# Patient Record
Sex: FEMALE | Race: WHITE | NOT HISPANIC OR LATINO | Employment: FULL TIME | URBAN - METROPOLITAN AREA
[De-identification: names, ages, dates, MRNs, and addresses within clinical notes are randomized per-mention and may not be internally consistent; named-entity substitution may affect disease eponyms.]

---

## 2023-04-10 DIAGNOSIS — E11.9 TYPE 2 DIABETES MELLITUS WITHOUT COMPLICATION, UNSPECIFIED WHETHER LONG TERM INSULIN USE (MULTI): Primary | ICD-10-CM

## 2023-04-10 RX ORDER — GLIMEPIRIDE 4 MG/1
2 TABLET ORAL DAILY
COMMUNITY
Start: 2022-03-28 | End: 2023-04-10 | Stop reason: SDUPTHER

## 2023-04-13 RX ORDER — GLIMEPIRIDE 4 MG/1
4 TABLET ORAL 2 TIMES DAILY
Qty: 180 TABLET | Refills: 0 | Status: SHIPPED | OUTPATIENT
Start: 2023-04-13 | End: 2023-08-24 | Stop reason: SDUPTHER

## 2023-06-09 ENCOUNTER — TELEPHONE (OUTPATIENT)
Dept: PRIMARY CARE | Facility: CLINIC | Age: 58
End: 2023-06-09

## 2023-06-09 DIAGNOSIS — E78.2 MIXED HYPERLIPIDEMIA: Primary | ICD-10-CM

## 2023-06-16 RX ORDER — PRAVASTATIN SODIUM 40 MG/1
TABLET ORAL
COMMUNITY
Start: 2022-08-30 | End: 2023-06-16 | Stop reason: SDUPTHER

## 2023-06-18 RX ORDER — PRAVASTATIN SODIUM 40 MG/1
TABLET ORAL
Qty: 90 TABLET | Refills: 0 | Status: SHIPPED | OUTPATIENT
Start: 2023-06-18 | End: 2023-07-18 | Stop reason: SDUPTHER

## 2023-07-18 DIAGNOSIS — E78.2 MIXED HYPERLIPIDEMIA: ICD-10-CM

## 2023-07-18 DIAGNOSIS — Z79.4 TYPE 2 DIABETES MELLITUS WITHOUT COMPLICATION, WITH LONG-TERM CURRENT USE OF INSULIN (MULTI): Primary | ICD-10-CM

## 2023-07-18 DIAGNOSIS — E11.9 TYPE 2 DIABETES MELLITUS WITHOUT COMPLICATION, WITH LONG-TERM CURRENT USE OF INSULIN (MULTI): Primary | ICD-10-CM

## 2023-07-22 RX ORDER — PRAVASTATIN SODIUM 40 MG/1
TABLET ORAL
Qty: 90 TABLET | Refills: 0 | Status: SHIPPED | OUTPATIENT
Start: 2023-07-22

## 2023-07-24 RX ORDER — PEN NEEDLE, DIABETIC 32GX 5/32"
NEEDLE, DISPOSABLE MISCELLANEOUS
Qty: 100 EACH | Refills: 1 | Status: SHIPPED | OUTPATIENT
Start: 2023-07-24 | End: 2023-11-16

## 2023-07-24 RX ORDER — PEN NEEDLE, DIABETIC 32GX 5/32"
NEEDLE, DISPOSABLE MISCELLANEOUS DAILY
COMMUNITY
Start: 2023-02-17 | End: 2023-07-24 | Stop reason: SDUPTHER

## 2023-07-25 DIAGNOSIS — E78.2 MIXED HYPERLIPIDEMIA: Primary | ICD-10-CM

## 2023-07-25 RX ORDER — ROSUVASTATIN CALCIUM 20 MG/1
20 TABLET, COATED ORAL DAILY
COMMUNITY
Start: 2022-09-24 | End: 2023-07-25 | Stop reason: SDUPTHER

## 2023-07-27 RX ORDER — ROSUVASTATIN CALCIUM 20 MG/1
20 TABLET, COATED ORAL DAILY
Qty: 90 TABLET | Refills: 0 | Status: SHIPPED | OUTPATIENT
Start: 2023-07-27 | End: 2023-10-10

## 2023-08-24 DIAGNOSIS — E11.9 TYPE 2 DIABETES MELLITUS WITHOUT COMPLICATION, UNSPECIFIED WHETHER LONG TERM INSULIN USE (MULTI): ICD-10-CM

## 2023-08-24 RX ORDER — GLIMEPIRIDE 4 MG/1
4 TABLET ORAL 2 TIMES DAILY
Qty: 180 TABLET | Refills: 0 | Status: SHIPPED | OUTPATIENT
Start: 2023-08-24 | End: 2023-10-10

## 2023-10-12 DIAGNOSIS — Z00.00 HEALTH CARE MAINTENANCE: Primary | ICD-10-CM

## 2023-10-14 RX ORDER — LEVOCETIRIZINE DIHYDROCHLORIDE 5 MG/1
5 TABLET, FILM COATED ORAL EVERY EVENING
Qty: 90 TABLET | Refills: 0 | Status: SHIPPED | OUTPATIENT
Start: 2023-10-14 | End: 2023-12-27

## 2023-10-25 DIAGNOSIS — Z00.00 HEALTH CARE MAINTENANCE: Primary | ICD-10-CM

## 2023-10-25 RX ORDER — INSULIN GLARGINE 100 [IU]/ML
100 INJECTION, SOLUTION SUBCUTANEOUS DAILY
COMMUNITY
End: 2023-10-25 | Stop reason: SDUPTHER

## 2023-10-26 DIAGNOSIS — Z79.4 TYPE 2 DIABETES MELLITUS WITHOUT COMPLICATION, WITH LONG-TERM CURRENT USE OF INSULIN (MULTI): Primary | ICD-10-CM

## 2023-10-26 DIAGNOSIS — E11.9 TYPE 2 DIABETES MELLITUS WITHOUT COMPLICATION, WITH LONG-TERM CURRENT USE OF INSULIN (MULTI): Primary | ICD-10-CM

## 2023-10-26 RX ORDER — INSULIN GLARGINE 100 [IU]/ML
100 INJECTION, SOLUTION SUBCUTANEOUS DAILY
Qty: 3 ML | Refills: 0 | Status: SHIPPED | OUTPATIENT
Start: 2023-10-26 | End: 2023-11-16

## 2023-10-27 ENCOUNTER — OFFICE VISIT (OUTPATIENT)
Dept: PRIMARY CARE | Facility: CLINIC | Age: 58
End: 2023-10-27
Payer: COMMERCIAL

## 2023-10-27 VITALS — WEIGHT: 183 LBS | DIASTOLIC BLOOD PRESSURE: 85 MMHG | SYSTOLIC BLOOD PRESSURE: 145 MMHG

## 2023-10-27 DIAGNOSIS — Z00.00 HEALTH CARE MAINTENANCE: ICD-10-CM

## 2023-10-27 DIAGNOSIS — Z79.4 TYPE 2 DIABETES MELLITUS WITHOUT COMPLICATION, WITH LONG-TERM CURRENT USE OF INSULIN (MULTI): Primary | ICD-10-CM

## 2023-10-27 DIAGNOSIS — E78.2 MIXED HYPERLIPIDEMIA: ICD-10-CM

## 2023-10-27 DIAGNOSIS — E11.9 TYPE 2 DIABETES MELLITUS WITHOUT COMPLICATION, WITH LONG-TERM CURRENT USE OF INSULIN (MULTI): Primary | ICD-10-CM

## 2023-10-27 DIAGNOSIS — I10 PRIMARY HYPERTENSION: ICD-10-CM

## 2023-10-27 LAB
ALBUMIN SERPL BCP-MCNC: 3.9 G/DL (ref 3.4–5)
ALP SERPL-CCNC: 79 U/L (ref 33–110)
ALT SERPL W P-5'-P-CCNC: 26 U/L (ref 7–45)
ANION GAP SERPL CALC-SCNC: 13 MMOL/L (ref 10–20)
AST SERPL W P-5'-P-CCNC: 22 U/L (ref 9–39)
BILIRUB SERPL-MCNC: 0.5 MG/DL (ref 0–1.2)
BUN SERPL-MCNC: 20 MG/DL (ref 6–23)
CALCIUM SERPL-MCNC: 9.6 MG/DL (ref 8.6–10.6)
CHLORIDE SERPL-SCNC: 102 MMOL/L (ref 98–107)
CHOLEST SERPL-MCNC: 196 MG/DL (ref 0–199)
CHOLESTEROL/HDL RATIO: 3.8
CO2 SERPL-SCNC: 29 MMOL/L (ref 21–32)
CREAT SERPL-MCNC: 0.81 MG/DL (ref 0.5–1.05)
ERYTHROCYTE [DISTWIDTH] IN BLOOD BY AUTOMATED COUNT: 13.2 % (ref 11.5–14.5)
EST. AVERAGE GLUCOSE BLD GHB EST-MCNC: 249 MG/DL
GFR SERPL CREATININE-BSD FRML MDRD: 85 ML/MIN/1.73M*2
GLUCOSE SERPL-MCNC: 242 MG/DL (ref 74–99)
HBA1C MFR BLD: 10.3 %
HCT VFR BLD AUTO: 45.1 % (ref 36–46)
HDLC SERPL-MCNC: 52 MG/DL
HGB BLD-MCNC: 13.8 G/DL (ref 12–16)
LDLC SERPL CALC-MCNC: 124 MG/DL
MCH RBC QN AUTO: 25.6 PG (ref 26–34)
MCHC RBC AUTO-ENTMCNC: 30.6 G/DL (ref 32–36)
MCV RBC AUTO: 84 FL (ref 80–100)
NON HDL CHOLESTEROL: 144 MG/DL (ref 0–149)
NRBC BLD-RTO: 0 /100 WBCS (ref 0–0)
PLATELET # BLD AUTO: 300 X10*3/UL (ref 150–450)
PMV BLD AUTO: 12.6 FL (ref 7.5–11.5)
POTASSIUM SERPL-SCNC: 4.4 MMOL/L (ref 3.5–5.3)
PROT SERPL-MCNC: 6.7 G/DL (ref 6.4–8.2)
RBC # BLD AUTO: 5.4 X10*6/UL (ref 4–5.2)
SODIUM SERPL-SCNC: 140 MMOL/L (ref 136–145)
TRIGL SERPL-MCNC: 99 MG/DL (ref 0–149)
VLDL: 20 MG/DL (ref 0–40)
WBC # BLD AUTO: 6.9 X10*3/UL (ref 4.4–11.3)

## 2023-10-27 PROCEDURE — 3049F LDL-C 100-129 MG/DL: CPT | Performed by: INTERNAL MEDICINE

## 2023-10-27 PROCEDURE — 99214 OFFICE O/P EST MOD 30 MIN: CPT | Performed by: INTERNAL MEDICINE

## 2023-10-27 PROCEDURE — 36415 COLL VENOUS BLD VENIPUNCTURE: CPT

## 2023-10-27 PROCEDURE — 80053 COMPREHEN METABOLIC PANEL: CPT

## 2023-10-27 PROCEDURE — 3079F DIAST BP 80-89 MM HG: CPT | Performed by: INTERNAL MEDICINE

## 2023-10-27 PROCEDURE — 3077F SYST BP >= 140 MM HG: CPT | Performed by: INTERNAL MEDICINE

## 2023-10-27 PROCEDURE — 85027 COMPLETE CBC AUTOMATED: CPT

## 2023-10-27 PROCEDURE — 83036 HEMOGLOBIN GLYCOSYLATED A1C: CPT

## 2023-10-27 PROCEDURE — 80061 LIPID PANEL: CPT

## 2023-10-27 PROCEDURE — 3046F HEMOGLOBIN A1C LEVEL >9.0%: CPT | Performed by: INTERNAL MEDICINE

## 2023-10-27 NOTE — PROGRESS NOTES
Subjective   Patient ID: Andressa Saini is a 58 y.o. female who presents for No chief complaint on file..    HPI follow-up visit have not seen in some time no chest pain no shortness of breath has been using 50 units of insulin in the double Amaryl dosing and her blood sugars have been as low as the 80s sometimes above 200 postprandially but has not had polyuria polydipsia did have a vitreous issue with her left eye and is having surgery next week no side effect with medication    Review of Systems    Objective   There were no vitals taken for this visit.    Physical Exam vital signs noted alert and oriented x3 NCAT no JVD or bruit chest clear to auscultation CV regular rate and rhythm S1-S2 without murmur gallop or rub extremities no clubbing cyanosis or edema normal distal pulses    Assessment/Plan impression diabetes mellitus type 2 hypertension hyperlipidemia  Plan check A1c advised on long-term blood sugar test and either add additional medicine or increase in insulin may be needed she will call back after her eye surgery to go over the details of medication changes check Chem-7 advised on glucose potassium and kidney function advised on blood pressure blood pressure medicine currently not on check hepatic panel advised on liver enzymes check lipid panel advised on cholesterol profile and rosuvastatin therapy she had been taking Pravachol in the past diet weight loss exercise increase water consumption recheck more regularly and follow-up for visit and screening TT 40 cc 21

## 2023-10-29 RX ORDER — INSULIN GLARGINE 100 [IU]/ML
55 INJECTION, SOLUTION SUBCUTANEOUS EVERY MORNING
Qty: 15 ML | Refills: 1 | Status: SHIPPED | OUTPATIENT
Start: 2023-10-29 | End: 2023-12-06

## 2023-11-02 ENCOUNTER — TELEPHONE (OUTPATIENT)
Dept: PRIMARY CARE | Facility: CLINIC | Age: 58
End: 2023-11-02

## 2023-11-04 ENCOUNTER — APPOINTMENT (OUTPATIENT)
Dept: RADIOLOGY | Facility: HOSPITAL | Age: 58
End: 2023-11-04
Payer: COMMERCIAL

## 2023-11-04 ENCOUNTER — HOSPITAL ENCOUNTER (EMERGENCY)
Facility: HOSPITAL | Age: 58
Discharge: HOME | End: 2023-11-04
Attending: EMERGENCY MEDICINE
Payer: COMMERCIAL

## 2023-11-04 VITALS
HEIGHT: 62 IN | DIASTOLIC BLOOD PRESSURE: 87 MMHG | BODY MASS INDEX: 33.68 KG/M2 | HEART RATE: 74 BPM | TEMPERATURE: 97.2 F | OXYGEN SATURATION: 100 % | SYSTOLIC BLOOD PRESSURE: 152 MMHG | WEIGHT: 183 LBS | RESPIRATION RATE: 16 BRPM

## 2023-11-04 DIAGNOSIS — N23 RENAL COLIC ON RIGHT SIDE: ICD-10-CM

## 2023-11-04 DIAGNOSIS — N20.0 NEPHROLITHIASIS: Primary | ICD-10-CM

## 2023-11-04 LAB
ALBUMIN SERPL BCP-MCNC: 4.3 G/DL (ref 3.4–5)
ALP SERPL-CCNC: 83 U/L (ref 33–110)
ALT SERPL W P-5'-P-CCNC: 29 U/L (ref 7–45)
ANION GAP SERPL CALC-SCNC: 13 MMOL/L (ref 10–20)
APPEARANCE UR: CLEAR
AST SERPL W P-5'-P-CCNC: 17 U/L (ref 9–39)
BACTERIA #/AREA URNS AUTO: ABNORMAL /HPF
BASOPHILS # BLD AUTO: 0.07 X10*3/UL (ref 0–0.1)
BASOPHILS NFR BLD AUTO: 0.8 %
BILIRUB SERPL-MCNC: 0.8 MG/DL (ref 0–1.2)
BILIRUB UR STRIP.AUTO-MCNC: NEGATIVE MG/DL
BUN SERPL-MCNC: 19 MG/DL (ref 6–23)
CALCIUM SERPL-MCNC: 9.6 MG/DL (ref 8.6–10.3)
CHLORIDE SERPL-SCNC: 98 MMOL/L (ref 98–107)
CO2 SERPL-SCNC: 28 MMOL/L (ref 21–32)
COLOR UR: YELLOW
CREAT SERPL-MCNC: 1.18 MG/DL (ref 0.5–1.05)
EOSINOPHIL # BLD AUTO: 0.24 X10*3/UL (ref 0–0.7)
EOSINOPHIL NFR BLD AUTO: 2.9 %
ERYTHROCYTE [DISTWIDTH] IN BLOOD BY AUTOMATED COUNT: 12.9 % (ref 11.5–14.5)
GFR SERPL CREATININE-BSD FRML MDRD: 54 ML/MIN/1.73M*2
GLUCOSE SERPL-MCNC: 341 MG/DL (ref 74–99)
GLUCOSE UR STRIP.AUTO-MCNC: ABNORMAL MG/DL
HCT VFR BLD AUTO: 45.7 % (ref 36–46)
HGB BLD-MCNC: 14.6 G/DL (ref 12–16)
HOLD SPECIMEN: NORMAL
IMM GRANULOCYTES # BLD AUTO: 0.04 X10*3/UL (ref 0–0.7)
IMM GRANULOCYTES NFR BLD AUTO: 0.5 % (ref 0–0.9)
KETONES UR STRIP.AUTO-MCNC: NEGATIVE MG/DL
LEUKOCYTE ESTERASE UR QL STRIP.AUTO: ABNORMAL
LYMPHOCYTES # BLD AUTO: 1.38 X10*3/UL (ref 1.2–4.8)
LYMPHOCYTES NFR BLD AUTO: 16.6 %
MCH RBC QN AUTO: 26.3 PG (ref 26–34)
MCHC RBC AUTO-ENTMCNC: 31.9 G/DL (ref 32–36)
MCV RBC AUTO: 82 FL (ref 80–100)
MONOCYTES # BLD AUTO: 0.55 X10*3/UL (ref 0.1–1)
MONOCYTES NFR BLD AUTO: 6.6 %
NEUTROPHILS # BLD AUTO: 6.05 X10*3/UL (ref 1.2–7.7)
NEUTROPHILS NFR BLD AUTO: 72.6 %
NITRITE UR QL STRIP.AUTO: NEGATIVE
NRBC BLD-RTO: 0 /100 WBCS (ref 0–0)
PH UR STRIP.AUTO: 5 [PH]
PLATELET # BLD AUTO: 297 X10*3/UL (ref 150–450)
POTASSIUM SERPL-SCNC: 4.1 MMOL/L (ref 3.5–5.3)
PROT SERPL-MCNC: 7.6 G/DL (ref 6.4–8.2)
PROT UR STRIP.AUTO-MCNC: ABNORMAL MG/DL
RBC # BLD AUTO: 5.55 X10*6/UL (ref 4–5.2)
RBC # UR STRIP.AUTO: ABNORMAL /UL
RBC #/AREA URNS AUTO: ABNORMAL /HPF
SODIUM SERPL-SCNC: 135 MMOL/L (ref 136–145)
SP GR UR STRIP.AUTO: 1.02
SQUAMOUS #/AREA URNS AUTO: ABNORMAL /HPF
UROBILINOGEN UR STRIP.AUTO-MCNC: <2 MG/DL
WBC # BLD AUTO: 8.3 X10*3/UL (ref 4.4–11.3)
WBC #/AREA URNS AUTO: ABNORMAL /HPF
YEAST BUDDING #/AREA UR COMP ASSIST: PRESENT /HPF

## 2023-11-04 PROCEDURE — 87086 URINE CULTURE/COLONY COUNT: CPT | Mod: GEALAB | Performed by: STUDENT IN AN ORGANIZED HEALTH CARE EDUCATION/TRAINING PROGRAM

## 2023-11-04 PROCEDURE — 99284 EMERGENCY DEPT VISIT MOD MDM: CPT | Mod: 25 | Performed by: EMERGENCY MEDICINE

## 2023-11-04 PROCEDURE — 96374 THER/PROPH/DIAG INJ IV PUSH: CPT

## 2023-11-04 PROCEDURE — 2500000004 HC RX 250 GENERAL PHARMACY W/ HCPCS (ALT 636 FOR OP/ED): Performed by: STUDENT IN AN ORGANIZED HEALTH CARE EDUCATION/TRAINING PROGRAM

## 2023-11-04 PROCEDURE — 36415 COLL VENOUS BLD VENIPUNCTURE: CPT | Performed by: STUDENT IN AN ORGANIZED HEALTH CARE EDUCATION/TRAINING PROGRAM

## 2023-11-04 PROCEDURE — 96361 HYDRATE IV INFUSION ADD-ON: CPT

## 2023-11-04 PROCEDURE — 81001 URINALYSIS AUTO W/SCOPE: CPT | Performed by: STUDENT IN AN ORGANIZED HEALTH CARE EDUCATION/TRAINING PROGRAM

## 2023-11-04 PROCEDURE — 80053 COMPREHEN METABOLIC PANEL: CPT | Performed by: STUDENT IN AN ORGANIZED HEALTH CARE EDUCATION/TRAINING PROGRAM

## 2023-11-04 PROCEDURE — 74176 CT ABD & PELVIS W/O CONTRAST: CPT | Performed by: RADIOLOGY

## 2023-11-04 PROCEDURE — 85025 COMPLETE CBC W/AUTO DIFF WBC: CPT | Performed by: STUDENT IN AN ORGANIZED HEALTH CARE EDUCATION/TRAINING PROGRAM

## 2023-11-04 PROCEDURE — 96375 TX/PRO/DX INJ NEW DRUG ADDON: CPT

## 2023-11-04 PROCEDURE — 74176 CT ABD & PELVIS W/O CONTRAST: CPT

## 2023-11-04 RX ORDER — NAPROXEN 500 MG/1
500 TABLET ORAL 2 TIMES DAILY PRN
Qty: 20 TABLET | Refills: 0 | Status: SHIPPED | OUTPATIENT
Start: 2023-11-04

## 2023-11-04 RX ORDER — METOCLOPRAMIDE HYDROCHLORIDE 5 MG/ML
10 INJECTION INTRAMUSCULAR; INTRAVENOUS ONCE
Status: DISCONTINUED | OUTPATIENT
Start: 2023-11-04 | End: 2023-11-04 | Stop reason: HOSPADM

## 2023-11-04 RX ORDER — KETOROLAC TROMETHAMINE 15 MG/ML
15 INJECTION, SOLUTION INTRAMUSCULAR; INTRAVENOUS ONCE
Status: COMPLETED | OUTPATIENT
Start: 2023-11-04 | End: 2023-11-04

## 2023-11-04 RX ORDER — ONDANSETRON 4 MG/1
4 TABLET, ORALLY DISINTEGRATING ORAL EVERY 8 HOURS PRN
Qty: 20 TABLET | Refills: 0 | Status: SHIPPED | OUTPATIENT
Start: 2023-11-04

## 2023-11-04 RX ORDER — ONDANSETRON HYDROCHLORIDE 2 MG/ML
4 INJECTION, SOLUTION INTRAVENOUS ONCE
Status: COMPLETED | OUTPATIENT
Start: 2023-11-04 | End: 2023-11-04

## 2023-11-04 RX ORDER — CEPHALEXIN 500 MG/1
500 CAPSULE ORAL 2 TIMES DAILY
Qty: 10 CAPSULE | Refills: 0 | Status: SHIPPED | OUTPATIENT
Start: 2023-11-04 | End: 2023-11-09

## 2023-11-04 RX ORDER — TAMSULOSIN HYDROCHLORIDE 0.4 MG/1
0.4 CAPSULE ORAL DAILY
Qty: 7 CAPSULE | Refills: 0 | Status: SHIPPED | OUTPATIENT
Start: 2023-11-04 | End: 2023-11-11

## 2023-11-04 RX ORDER — METOCLOPRAMIDE HYDROCHLORIDE 5 MG/ML
INJECTION INTRAMUSCULAR; INTRAVENOUS
Status: DISCONTINUED
Start: 2023-11-04 | End: 2023-11-04 | Stop reason: WASHOUT

## 2023-11-04 RX ORDER — ACETAMINOPHEN 325 MG/1
975 TABLET ORAL EVERY 6 HOURS PRN
Qty: 84 TABLET | Refills: 0 | Status: SHIPPED | OUTPATIENT
Start: 2023-11-04 | End: 2023-11-11

## 2023-11-04 RX ADMIN — KETOROLAC TROMETHAMINE 15 MG: 15 INJECTION INTRAMUSCULAR; INTRAVENOUS at 15:08

## 2023-11-04 RX ADMIN — ONDANSETRON 4 MG: 2 INJECTION INTRAMUSCULAR; INTRAVENOUS at 15:08

## 2023-11-04 RX ADMIN — SODIUM CHLORIDE, POTASSIUM CHLORIDE, SODIUM LACTATE AND CALCIUM CHLORIDE 1000 ML: 600; 310; 30; 20 INJECTION, SOLUTION INTRAVENOUS at 15:13

## 2023-11-04 ASSESSMENT — LIFESTYLE VARIABLES
HAVE YOU EVER FELT YOU SHOULD CUT DOWN ON YOUR DRINKING: NO
REASON UNABLE TO ASSESS: NO
EVER FELT BAD OR GUILTY ABOUT YOUR DRINKING: NO
EVER HAD A DRINK FIRST THING IN THE MORNING TO STEADY YOUR NERVES TO GET RID OF A HANGOVER: NO
HAVE PEOPLE ANNOYED YOU BY CRITICIZING YOUR DRINKING: NO

## 2023-11-04 ASSESSMENT — PAIN DESCRIPTION - DESCRIPTORS
DESCRIPTORS: ACHING;SHARP
DESCRIPTORS: ACHING;STABBING;SHARP

## 2023-11-04 ASSESSMENT — PAIN DESCRIPTION - FREQUENCY: FREQUENCY: CONSTANT/CONTINUOUS

## 2023-11-04 ASSESSMENT — COLUMBIA-SUICIDE SEVERITY RATING SCALE - C-SSRS
2. HAVE YOU ACTUALLY HAD ANY THOUGHTS OF KILLING YOURSELF?: NO
1. IN THE PAST MONTH, HAVE YOU WISHED YOU WERE DEAD OR WISHED YOU COULD GO TO SLEEP AND NOT WAKE UP?: NO
6. HAVE YOU EVER DONE ANYTHING, STARTED TO DO ANYTHING, OR PREPARED TO DO ANYTHING TO END YOUR LIFE?: NO

## 2023-11-04 ASSESSMENT — PAIN DESCRIPTION - LOCATION: LOCATION: ABDOMEN

## 2023-11-04 ASSESSMENT — PAIN - FUNCTIONAL ASSESSMENT: PAIN_FUNCTIONAL_ASSESSMENT: 0-10

## 2023-11-04 ASSESSMENT — PAIN DESCRIPTION - PROGRESSION: CLINICAL_PROGRESSION: GRADUALLY WORSENING

## 2023-11-04 ASSESSMENT — PAIN DESCRIPTION - ORIENTATION: ORIENTATION: RIGHT

## 2023-11-04 ASSESSMENT — PAIN SCALES - GENERAL: PAINLEVEL_OUTOF10: 7

## 2023-11-04 ASSESSMENT — PAIN DESCRIPTION - ONSET: ONSET: ONGOING

## 2023-11-04 ASSESSMENT — PAIN DESCRIPTION - PAIN TYPE: TYPE: ACUTE PAIN

## 2023-11-04 NOTE — ED PROVIDER NOTES
CC: Abdominal Pain     HPI:  58-year-old female presents to the emergency department with right-sided abdominal/flank pain which started last night.  Patient demonstrates onset of several hours of pain in the right abdomen which abruptly resolved last night.  Had initially waited in the emergency department to be seen, but left without being seen.    Pain returned today with associated nausea and one episode of vomiting, prompting presentation to the ED.    No fevers or chills, urinary symptoms, or other associated symptoms.    Of note, had vitrectomy of left eye on Tuesday.  Currently on eye drops.    Records Reviewed:  Recent available ED and inpatient notes reviewed in EMR.    PMHx/PSHx:  Per HPI.   - has no past medical history on file.  - has no past surgical history on file.  - does not have a problem list on file.    Medications:  Reviewed in EMR. See EMR for complete list of medications and doses.    Allergies:  Patient has no known allergies.    Social History:  - Tobacco:  reports that she has never smoked. She has never used smokeless tobacco.   - Alcohol:  reports that she does not currently use alcohol.   - Illicit Drugs:  reports that she does not currently use drugs.     ROS:  Per HPI.       ???????????????????????????????????????????????????????????????  Triage Vitals:  T 36.2 °C (97.2 °F)  HR 92  BP (!) 172/104  RR 18  O2 96 % None (Room air)    Physical Exam  Constitutional:       General: She is not in acute distress.     Comments: Appears uncomfortable   HENT:      Head: Atraumatic.   Eyes:      General: No scleral icterus.     Comments: Conjunctival injection left eye   Cardiovascular:      Rate and Rhythm: Regular rhythm.      Heart sounds: No murmur heard.     No friction rub. No gallop.   Pulmonary:      Effort: No respiratory distress.      Breath sounds: Normal breath sounds. No wheezing or rales.   Chest:      Chest wall: No tenderness.   Abdominal:      Palpations: Abdomen is soft.       Tenderness: There is abdominal tenderness (Minimal RLQ and R CVA).   Musculoskeletal:         General: No swelling.   Skin:     General: Skin is warm and dry.      Findings: No rash.   Neurological:      Mental Status: She is alert.      Cranial Nerves: No facial asymmetry.   Psychiatric:         Mood and Affect: Mood normal.         Behavior: Behavior normal.       ???????????????????????????????????????????????????????????????  Assessment and Plan:  58-year-old female presents to the emergency department severe right-sided abdominal pain starting last night.  Presentation history is most consistent with nephrolithiasis, CT imaging was ordered to evaluate further along with urinalysis and basic labs.  The patient was given Toradol for pain control and Zofran for nausea.    ED Course:  CT imaging confirms a 3 mm stone at the right ureterovesicular junction with minimal hydronephrosis.  Urinalysis without obvious infection, but does have moderate leukocyte esterase and white blood cells, although also with several RBC and squamous epithelial cells.  She is not having any systemic symptoms, will cover empirically with cephalexin although I do not believe this is a true infected stone.    CMP with slight MINA (creatinine 1.18), treated with a liter of fluids.    Pain resolved on reevaluation.  Patient continues to have no systemic symptoms.  Discussed with patient, comfortable with plan to discharge with prescription for naproxen and tylenol for pain, flomax, and cephalexin.  Understands to return to the emergency department immediately if she develops fevers or other systemic symptoms.  Will follow-up with urology as outpatient.    Social Determinants Limiting Care:  None identified    Disposition:  Discharge home    --  Cecilia Geronimo MD  Emergency Medicine, PGY-3      Procedures ? Smart[employer] last updated 11/4/2023 3:03 PM         Cecilia Geronimo MD  Resident  11/04/23 1630       Cecilia Geronimo MD  Resident  11/04/23  [id number]

## 2023-11-04 NOTE — DISCHARGE INSTRUCTIONS
Take naproxen and Tylenol as needed for pain.  Take cephalexin (Keflex) which is an antibiotic for the next 5 days.  Take tamsulosin (Flomax) as prescribed or until you passed your stone to help facilitate the stone passing.  Follow-up with urology as needed, call number above to make an appointment.    Return to the emergency department if you develop fevers or for any other acute concerns.

## 2023-11-04 NOTE — PROGRESS NOTES
The patient was seen by the midlevel/resident.  I have personally performed a substantive portion of the encounter.  I reviewed the EKG's (when done) and agree with the interpretation.  I have seen and examined the patient; agree with the workup, evaluation, MDM, management and diagnosis.  The care plan has been discussed with the midlevel/resident; I have reviewed the note and agree with the documented findings.       Presents with severe right flank pain comes and goes colicky type pain.  Clinically suspect kidney stone.  Patient does not have a history of stones.  Will be worked up in the ED with CT scan labs given some Toradol 15 mg IV and fluids.    Ousmane Betancourt MD

## 2023-11-07 ENCOUNTER — HOSPITAL ENCOUNTER (OUTPATIENT)
Dept: RADIOLOGY | Facility: HOSPITAL | Age: 58
Discharge: HOME | End: 2023-11-07
Payer: COMMERCIAL

## 2023-11-07 DIAGNOSIS — N20.0 CALCULUS OF KIDNEY: ICD-10-CM

## 2023-11-07 LAB — BACTERIA UR CULT: NORMAL

## 2023-11-07 PROCEDURE — 74018 RADEX ABDOMEN 1 VIEW: CPT

## 2023-11-16 DIAGNOSIS — Z00.00 HEALTH CARE MAINTENANCE: ICD-10-CM

## 2023-11-16 DIAGNOSIS — Z79.4 TYPE 2 DIABETES MELLITUS WITHOUT COMPLICATION, WITH LONG-TERM CURRENT USE OF INSULIN (MULTI): ICD-10-CM

## 2023-11-16 DIAGNOSIS — E11.9 TYPE 2 DIABETES MELLITUS WITHOUT COMPLICATION, WITH LONG-TERM CURRENT USE OF INSULIN (MULTI): ICD-10-CM

## 2023-11-16 RX ORDER — INSULIN GLARGINE 100 [IU]/ML
100 INJECTION, SOLUTION SUBCUTANEOUS DAILY
Qty: 15 ML | Refills: 0 | Status: SHIPPED | OUTPATIENT
Start: 2023-11-16 | End: 2023-11-23

## 2023-11-16 RX ORDER — PEN NEEDLE, DIABETIC 32GX 5/32"
NEEDLE, DISPOSABLE MISCELLANEOUS
Qty: 100 EACH | Refills: 1 | Status: SHIPPED | OUTPATIENT
Start: 2023-11-16 | End: 2024-04-02

## 2023-11-17 DIAGNOSIS — Z00.00 HEALTH CARE MAINTENANCE: ICD-10-CM

## 2023-11-23 RX ORDER — INSULIN GLARGINE-YFGN 100 [IU]/ML
60 INJECTION, SOLUTION SUBCUTANEOUS EVERY MORNING
Qty: 15 ML | Refills: 3 | Status: SHIPPED | OUTPATIENT
Start: 2023-11-23

## 2023-12-03 DIAGNOSIS — Z79.4 TYPE 2 DIABETES MELLITUS WITHOUT COMPLICATION, WITH LONG-TERM CURRENT USE OF INSULIN (MULTI): ICD-10-CM

## 2023-12-03 DIAGNOSIS — E11.9 TYPE 2 DIABETES MELLITUS WITHOUT COMPLICATION, WITH LONG-TERM CURRENT USE OF INSULIN (MULTI): ICD-10-CM

## 2023-12-06 RX ORDER — INSULIN GLARGINE 100 [IU]/ML
INJECTION, SOLUTION SUBCUTANEOUS
Qty: 15 ML | Refills: 2 | Status: SHIPPED | OUTPATIENT
Start: 2023-12-06 | End: 2024-02-08

## 2023-12-26 DIAGNOSIS — Z00.00 HEALTH CARE MAINTENANCE: ICD-10-CM

## 2023-12-27 RX ORDER — LEVOCETIRIZINE DIHYDROCHLORIDE 5 MG/1
TABLET, FILM COATED ORAL
Qty: 90 TABLET | Refills: 3 | Status: SHIPPED | OUTPATIENT
Start: 2023-12-27

## 2024-01-12 DIAGNOSIS — Z00.00 HEALTH CARE MAINTENANCE: Primary | ICD-10-CM

## 2024-01-13 RX ORDER — FLUCONAZOLE 150 MG/1
150 TABLET ORAL DAILY
Qty: 2 TABLET | Refills: 0 | Status: SHIPPED | OUTPATIENT
Start: 2024-01-13 | End: 2024-04-16

## 2024-02-08 DIAGNOSIS — Z79.4 TYPE 2 DIABETES MELLITUS WITHOUT COMPLICATION, WITH LONG-TERM CURRENT USE OF INSULIN (MULTI): ICD-10-CM

## 2024-02-08 DIAGNOSIS — E11.9 TYPE 2 DIABETES MELLITUS WITHOUT COMPLICATION, WITH LONG-TERM CURRENT USE OF INSULIN (MULTI): ICD-10-CM

## 2024-02-08 RX ORDER — INSULIN GLARGINE 100 [IU]/ML
INJECTION, SOLUTION SUBCUTANEOUS
Qty: 15 ML | Refills: 2 | Status: SHIPPED | OUTPATIENT
Start: 2024-02-08 | End: 2024-04-25

## 2024-04-02 DIAGNOSIS — Z79.4 TYPE 2 DIABETES MELLITUS WITHOUT COMPLICATION, WITH LONG-TERM CURRENT USE OF INSULIN (MULTI): ICD-10-CM

## 2024-04-02 DIAGNOSIS — E11.9 TYPE 2 DIABETES MELLITUS WITHOUT COMPLICATION, WITH LONG-TERM CURRENT USE OF INSULIN (MULTI): ICD-10-CM

## 2024-04-02 RX ORDER — PEN NEEDLE, DIABETIC 32GX 5/32"
NEEDLE, DISPOSABLE MISCELLANEOUS
Qty: 100 EACH | Refills: 1 | Status: SHIPPED | OUTPATIENT
Start: 2024-04-02

## 2024-04-14 DIAGNOSIS — Z00.00 HEALTH CARE MAINTENANCE: ICD-10-CM

## 2024-04-16 RX ORDER — FLUCONAZOLE 150 MG/1
150 TABLET ORAL DAILY
Qty: 2 TABLET | Refills: 0 | Status: SHIPPED | OUTPATIENT
Start: 2024-04-16

## 2024-04-21 DIAGNOSIS — Z79.4 TYPE 2 DIABETES MELLITUS WITHOUT COMPLICATION, WITH LONG-TERM CURRENT USE OF INSULIN (MULTI): ICD-10-CM

## 2024-04-21 DIAGNOSIS — E11.9 TYPE 2 DIABETES MELLITUS WITHOUT COMPLICATION, WITH LONG-TERM CURRENT USE OF INSULIN (MULTI): ICD-10-CM

## 2024-04-22 ENCOUNTER — OFFICE VISIT (OUTPATIENT)
Dept: ORTHOPEDIC SURGERY | Facility: HOSPITAL | Age: 59
End: 2024-04-22
Payer: COMMERCIAL

## 2024-04-22 ENCOUNTER — HOSPITAL ENCOUNTER (OUTPATIENT)
Dept: RADIOLOGY | Facility: HOSPITAL | Age: 59
Discharge: HOME | End: 2024-04-22
Payer: COMMERCIAL

## 2024-04-22 VITALS — BODY MASS INDEX: 33.68 KG/M2 | HEIGHT: 62 IN | WEIGHT: 183 LBS

## 2024-04-22 DIAGNOSIS — M22.41 PATELLA, CHONDROMALACIA, RIGHT: ICD-10-CM

## 2024-04-22 DIAGNOSIS — M25.561 RIGHT KNEE PAIN, UNSPECIFIED CHRONICITY: ICD-10-CM

## 2024-04-22 PROCEDURE — 73562 X-RAY EXAM OF KNEE 3: CPT | Mod: RIGHT SIDE | Performed by: RADIOLOGY

## 2024-04-22 PROCEDURE — 1036F TOBACCO NON-USER: CPT | Performed by: ORTHOPAEDIC SURGERY

## 2024-04-22 PROCEDURE — 99204 OFFICE O/P NEW MOD 45 MIN: CPT | Performed by: ORTHOPAEDIC SURGERY

## 2024-04-22 PROCEDURE — 99214 OFFICE O/P EST MOD 30 MIN: CPT | Performed by: ORTHOPAEDIC SURGERY

## 2024-04-22 PROCEDURE — 73562 X-RAY EXAM OF KNEE 3: CPT | Mod: RT

## 2024-04-22 RX ORDER — MELOXICAM 15 MG/1
15 TABLET ORAL DAILY
Qty: 14 TABLET | Refills: 0 | Status: SHIPPED | OUTPATIENT
Start: 2024-04-22 | End: 2024-05-06

## 2024-04-22 NOTE — PROGRESS NOTES
Patient here for evaluation of her right knee she has right knee mild mechanical symptoms and history of a pop as well as feeling of tightness in her knee no specific injury responsive partially to ibuprofen and knee brace.    The patient is pleasant and cooperative.  The patient is alert and oriented ×3.  Auditory function is intact.  The patient is a good historian.  The patient is not in acute distress.  Eye exam significant for nonicteric sclera, intact ocular muscle movement.  Breathing is rhythmic symmetric and nonlabored.  Body mass index 33.  Neutral alignment no instability varus or valgus stress negative Lachman negative anterior drawer negative posterior sag.  Calf soft and nontender.  Small effusion noted no joint line tenderness no significant retropatellar crepitus.    Radiographs demonstrate preservation of the retropatellar and tibiofemoral joint space no obvious arthritic degenerative changes.    Patella chondromalacia    Patient has mild patellofemoral chondromalacia.  I recommended a course of anti-inflammatory medicine to continue briefly for another week or so.  Prescription for meloxicam was provided.  In addition to recommend consultation with physical therapy.  Do not recommend surgical intervention.  The patient can wear her simple knee sleeve with an open patella at work which will help her tolerate the knee when it is bothering her.    Follow-up will be as needed.    This was dictated using voice recognition software and not corrected for grammatical or spelling errors.

## 2024-04-23 ENCOUNTER — TELEPHONE (OUTPATIENT)
Dept: ORTHOPEDIC SURGERY | Facility: HOSPITAL | Age: 59
End: 2024-04-23

## 2024-04-23 DIAGNOSIS — M22.41 PATELLA, CHONDROMALACIA, RIGHT: Primary | ICD-10-CM

## 2024-04-23 RX ORDER — MELOXICAM 15 MG/1
15 TABLET ORAL DAILY
Qty: 15 TABLET | Refills: 0 | Status: SHIPPED | OUTPATIENT
Start: 2024-04-23 | End: 2024-05-16

## 2024-04-23 NOTE — TELEPHONE ENCOUNTER
Patient called to report meloxicam was sent to wrong pharmacy at [facility] yesterday, would like it to go to [facility].  Pharmacy now confirmed in chart.  Please advise.

## 2024-04-23 NOTE — TELEPHONE ENCOUNTER
Patient informed new Rx sent to [facility], she asked that the prior Rx be canceled so she can fill it now.  I called [facility] to cancel. [facility] confirmed the order was shipped today and will arrive 3-5 days, unable to cancel the order.  I returned call to patient to inform, left message

## 2024-04-25 RX ORDER — INSULIN GLARGINE 100 [IU]/ML
INJECTION, SOLUTION SUBCUTANEOUS
Qty: 15 ML | Refills: 0 | Status: SHIPPED | OUTPATIENT
Start: 2024-04-25 | End: 2024-05-30

## 2024-05-16 ENCOUNTER — EVALUATION (OUTPATIENT)
Dept: PHYSICAL THERAPY | Facility: CLINIC | Age: 59
End: 2024-05-16
Payer: COMMERCIAL

## 2024-05-16 DIAGNOSIS — M25.561 ACUTE PAIN OF RIGHT KNEE: Primary | ICD-10-CM

## 2024-05-16 DIAGNOSIS — M22.41 PATELLA, CHONDROMALACIA, RIGHT: ICD-10-CM

## 2024-05-16 DIAGNOSIS — R26.2 DIFFICULTY WALKING: ICD-10-CM

## 2024-05-16 PROCEDURE — 97110 THERAPEUTIC EXERCISES: CPT | Mod: GP

## 2024-05-16 PROCEDURE — 97161 PT EVAL LOW COMPLEX 20 MIN: CPT | Mod: GP

## 2024-05-16 RX ORDER — MELOXICAM 15 MG/1
15 TABLET ORAL DAILY
Qty: 15 TABLET | Refills: 0 | Status: SHIPPED | OUTPATIENT
Start: 2024-05-16 | End: 2024-05-31

## 2024-05-16 ASSESSMENT — ENCOUNTER SYMPTOMS
OCCASIONAL FEELINGS OF UNSTEADINESS: 0
LOSS OF SENSATION IN FEET: 0
DEPRESSION: 0

## 2024-05-16 ASSESSMENT — PAIN - FUNCTIONAL ASSESSMENT: PAIN_FUNCTIONAL_ASSESSMENT: 0-10

## 2024-05-16 ASSESSMENT — PAIN SCALES - GENERAL: PAINLEVEL_OUTOF10: 5 - MODERATE PAIN

## 2024-05-16 NOTE — PROGRESS NOTES
Physical Therapy  Physical Therapy Orthopedic Evaluation    Patient Name: Andressa Saini  MRN: [id number]  Today's Date: 5/16/2024  Time Calculation  Start Time: 0830  Stop Time: 0915  Time Calculation (min): 45  PT Evaluation Time Entry  PT Evaluation (Low) Time Entry: 25  PT Therapeutic Procedures Time Entry  Therapeutic Exercise Time Entry: 20                    Insurance:  Visit number: 1 of 30  Authorization info: no auth  Insurance Type: Payor: MEDICAL [facility] / Plan: [employer]ICAL [employer] / Product Type: *No Product type* /      Current Problem  1. Patella, chondromalacia, right  Referral to Physical Therapy      2. Acute pain of right knee        3. Difficulty walking            Referred by: Dr. Hargrove    General:  General  Reason for Referral: Right knee pain  Referred By: Dr. Hargrove    Precautions:  Precautions  STEADI Fall Risk Score (The score of 4 or more indicates an increased risk of falling): 0  Precautions Comment: none  Medical History Form: Reviewed (scanned into chart)    Subjective:   Subjective   Chief Complaint: Patient presents w/ c/o R knee pain. She has had c/o popping for years now that has been pain free. When it popped on 4/10, had severe pain. Since then has been using meloxicam and an off the shelf knee brace which has been helping. Lately knee brace has been causing posterior knee pain after wearing for prolonged periods due to tightness of brace. Swelling has been on and off since GIGI.   Onset: 4/10/2024  MARCIA: Standing, possibly turned and pivoted on her knee when pop occurred.    Current Condition:   Better    Pain:  Pain Assessment: 0-10  Pain Score: 5 - Moderate pain (5 at rest and at its best, 10/10 at worst)  Location: Right knee, medial and proximal aspect.  Description: dully, achy  Aggravating Factors:  Bending/Straightening Knee, Standing, Walking, Squatting, and Stairs  Relieving Factors:  Rest, Compression, and Ice    Relevant Information (PMH & Previous  "Tests/Imaging): plain films, negative for acute pathology. Diabetes, TMJ surgery, torn MCL 18 years ago. Hx of R ankle sprains.  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    Patients Living Environment: Reviewed and no concern  Primary Language: English  Patient's Goal(s) for Therapy: no more pain in her knee    Red Flags: Do you have any of the following? No  Fever/chills, unexplained weight changes, dizziness/fainting, unexplained change in bowel or bladder functions, unexplained malaise or muscle weakness, night pain/sweats, numbness or tingling    Objective:    ROM    Hip AROM (Degrees)      (R)  (L)  Flexion: wfl  wfl   Extension: wfl  wfl     Abduction: wfl  wfl     Adduction: wfl  wfl     ER:  wfl  wfl     IR:  wfl  wfl         Knee AROM (Degrees)      (R)  (L)  Flexion: 130*  135      Extension: 0  0     *end range pain    Knee PROM (Degrees)      (R)  (L)  Flexion: 130*  135      Extension: 0*  0     *end range pain    Ankle AROM (Degrees)      (R)  (L)  Plantarflexion: wfl  wfl      Dorsiflexion: wfl  wfl     Inversion: wfl  wfl      Eversion: wfl  wfl            Strength Testing    Hip    (R)  (L)  Flexion: 4-  4-      Extension: 4-*  4-*     Abduction: 4+  5      *w/knee flexed 3+/5 B and extended     Knee    (R)  (L)  Flexion: 4-  4      Extension: 5*  5     *produces pain w/ RROM        Functional Screening  Squat: shifts weight off RLE mildly, produces pain immediately. No better/no worse w/ addition of heel wedge.    SL Balance: can stand on LLE 10+seconds, LOB in 5\" on RLE.    Lateral Step Down: Produces pain from 4\" box less than regular  Step downs: produces pain from 4\" box    Bridging: DL pain free for 3x10      Palpation: TTP superior patellar border, lateral joint line    Flexibility:   Elys: " "Pos R due to hips lifting off table  Obers: Neg B    Patella Mobility: wfl*  *crepitus w/ mobs    Observation: Brush test 1+      Orthotics: Using off the shelf knee brace for patellar stability      Gait: decreased stance time R, decreased step length L        Special Tests  Ligament Tests:   Lachman Test: neg   RALT: Pos R due to mild laxity   Valgus Stress Test: Pos R at 0 and 30 due to pain production, no laxity   Varus Stress Test: neg B  Meniscus   End range flexion: pos R due to pain production, not reduced w/ hamstring blocking   End range extension: pos due to pain production   Joint line tenderness: pos due to pain  Patella   Apprehension Test: neg   Grind Test: pos for crepitus w/ glides    Outcome Measures:  Other Measures  Lower Extremity Funtional Score (LEFS): 25/80     Treatments:    There-ex: 20'  Glut bridge 3x10 w/ 3\" holds*  Seated marches w/ green perform better TB 3x15 per side*  S/l Clamshell w/ green perform better TB 3x12 per side*  Knee extension iso hold x45\"*    * = added to HEP.  Sheet with exercise descriptions and images issued.  Skilled intervention utilized in the appropriate selection & application of above exercises.  Verbal and tactile cues provided for proper form and technique.  Pt. demonstrated appropriate form & verbalized understanding of optimal technique for above exercises.    Ice x10'    EDUCATION: Home exercise program, plan of care, activity modifications, pain management, and injury pathology       Assessment: Patient presents with signs and symptoms consistent with right knee pain, possible meniscal injury, resulting in limited participation in pain-free ADLs and inability to perform at their prior level of function. Pt would benefit from physical therapy to address the impairments found & listed previously in the objective section in order to return to safe and pain-free ADLs and prior level of function.       Plan:  PT Plan: Skilled PT  PT Frequency: 1 time per " week  Duration: 6 weeks  Onset Date: 05/16/24  Rehab Potential: Fair (due to current clinical presentation)  Plan of Care Agreement: Patient  Planned Interventions include: therapeutic exercise, self-care home management, manual therapy, therapeutic activities, gait training, neuromuscular coordination, vasopneumatic, dry needling, aquatic therapy    Goals: Set and discussed today  Active       Right knee pain       STGs       Start:  05/16/24    Expected End:  06/13/24       1. Patient will demonstrate independence w/ HEP to allow for self-management of symptoms  2. Patient will demonstrate increased strength during hip extension and flexion MMT to 4+/5 B to improve functional mobility and progress towards LTG and decrease knee strain.  3. Patient will demonstrate improved knee flexion ROM to 135 to equal unaffected side + pain free to demonstrate improved tolerance to mobility.  4. Patient will report a decrease in pain by at least 2 points to meet the MCID  5. Patient will perform non-reciprocal stair negotiation w/ a reported decrease in symptoms to progress towards long term goal           LTGs       Start:  05/16/24    Expected End:  07/11/24       1. Patient will report a decrease in pain w/ stair negotiation by at least 2 points to meet the MDC and improve performance.  2. Patient will be able to reciprocally negotiate stairs w/o a reproduction of familiar symptoms to improve ADL performance and demonstrate an improved tolerance to load.  3. Patient will report an improved score on LEFS by at least 9 points to meet the MDC  4. Patient will demonstrate improved strength during hip flexion and extension MMT to 5/5 B to improve ADL performance and decrease knee strain.  5. Patient will tolerate a full 8 hour shift at work w/o an increase in familiar pain to demonstrate an improved tolerance to functional mobility.   6. Patient will tolerate a full shift at work w/o a reactive knee effusion.  7. Patient will  improve knee flexion strength to 5/5 B during MMT to improve functional mobility.                   Plan of care was developed with input and agreement by the patient      Jeff Lyle, PT, ATC

## 2024-06-06 ENCOUNTER — APPOINTMENT (OUTPATIENT)
Dept: PHYSICAL THERAPY | Facility: CLINIC | Age: 59
End: 2024-06-06
Payer: COMMERCIAL

## 2024-06-10 ENCOUNTER — TREATMENT (OUTPATIENT)
Dept: PHYSICAL THERAPY | Facility: CLINIC | Age: 59
End: 2024-06-10
Payer: COMMERCIAL

## 2024-06-10 DIAGNOSIS — M25.561 ACUTE PAIN OF RIGHT KNEE: ICD-10-CM

## 2024-06-10 DIAGNOSIS — R26.2 DIFFICULTY WALKING: ICD-10-CM

## 2024-06-10 DIAGNOSIS — M22.41 PATELLA, CHONDROMALACIA, RIGHT: Primary | ICD-10-CM

## 2024-06-10 PROCEDURE — 97140 MANUAL THERAPY 1/> REGIONS: CPT | Mod: GP

## 2024-06-10 PROCEDURE — 97110 THERAPEUTIC EXERCISES: CPT | Mod: GP

## 2024-06-10 ASSESSMENT — PAIN - FUNCTIONAL ASSESSMENT: PAIN_FUNCTIONAL_ASSESSMENT: 0-10

## 2024-06-10 ASSESSMENT — PAIN SCALES - GENERAL: PAINLEVEL_OUTOF10: 4

## 2024-06-10 NOTE — PROGRESS NOTES
Physical Therapy  Physical Therapy Orthopedic Evaluation    Patient Name: Andressa Saini  MRN: [id number]  Today's Date: 6/10/2024  Time Calculation  Start Time: 1030  Stop Time: 1115  Time Calculation (min): 45 min    PT Therapeutic Procedures Time Entry  Manual Therapy Time Entry: 25  Therapeutic Exercise Time Entry: 20          Insurance:  Visit number: 2 of 30  Authorization info: no auth  Insurance Type: Payor: MEDICAL [facility] / Plan: MEDICAL [employer] / Product Type: *No Product type* /      Current Problem  1. Patella, chondromalacia, right        2. Acute pain of right knee        3. Difficulty walking              Referred by: Dr. Hargrove    General:  General  Reason for Referral: Right knee pain  Referred By: Dr. Hargrove    Precautions:  Precautions  Precautions Comment: none  Medical History Form: Reviewed (scanned into chart)    Subjective:   Subjective   Patient reports she is doing better since the time of her eval. Has stopped using meloxicam and has noticed a difference since stopping its use. Feels she has to move slowly to change certain positions due to stiffness, but does feel exercises have helped. She is having an easier time stepping up and down from curbs.    Pain:  Pain Assessment: 0-10  Pain Score: 4    Objective:    ROM    Hip AROM (Degrees)      (R)  (L)  Flexion: wfl  wfl   Extension: wfl  wfl     Abduction: wfl  wfl     Adduction: wfl  wfl     ER:  wfl  wfl     IR:  wfl  wfl         Knee AROM (Degrees) updated 6/10      (R)  (L)  Flexion: 135  135      Extension: 0  0         Knee PROM (Degrees) updated 6/10      (R)  (L)  Flexion: 135*  135      Extension: 0*  0     *end range pain    Ankle AROM (Degrees)      (R)  (L)  Plantarflexion: wfl  wfl      Dorsiflexion: wfl  wfl     Inversion: wfl  wfl      Eversion: wfl  wfl            Strength Testing    Hip    (R)  (L)  Flexion: 4-  4-      Extension: 4-*  4-*     Abduction: 4+  5      *w/knee flexed 3+/5 B and  "extended     Knee    (R)  (L)  Flexion: 4-  4      Extension: 5*  5     *produces pain w/ RROM        Functional Screening  Squat: shifts weight off RLE mildly, produces pain immediately. No better/no worse w/ addition of heel wedge.    SL Balance: can stand on LLE 10+seconds, LOB in 5\" on RLE.    Lateral Step Down: Produces pain from 4\" box less than regular  Step downs: produces pain from 4\" box    Bridging: DL pain free for 3x10      Palpation: TTP superior patellar border, lateral joint line    Flexibility:   Elys: Pos R due to hips lifting off table  Obers: Neg B    Patella Mobility: wfl*  *crepitus w/ mobs    Observation: Brush test 1+      Orthotics: Using off the shelf knee brace for patellar stability      Gait: updated 6/10. Improved step length and stance time compared to evaluation.         Special Tests updated 6/10  Ligament Tests:   Lachman Test: neg   RALT: Pos R due to mild laxity   Valgus Stress Test: Pos R at 0 and 30 due to pain production, no laxity   Varus Stress Test: neg B  Meniscus   End range flexion: pos R due to pain production, reduced w/ hamstring blocking   End range extension: pos due to pain production   Joint line tenderness: neg  Patella   Apprehension Test: neg   Grind Test: pos for crepitus w/ glides    Outcome Measures:        Treatments:    There-ex: 20'  Scifit bike seat 5 x5'  LAQs w/ 4# x15, w/ 5# 2x15*  TKE's w/ blue TB 3x15*   Kickstand RDLs to 6\" step 3x15*    * = added to HEP.  Sheet with exercise descriptions and images issued.  Skilled intervention utilized in the appropriate selection & application of above exercises.  Verbal and tactile cues provided for proper form and technique.  Pt. demonstrated appropriate form & verbalized understanding of optimal technique for above exercises.    Manual Therapy: 25  STM gastroc/soleus  Trg gastroc/soleus  STM distal hamstrings  PFJ mobs all directions  Knee flexion gapping mob    Ice x10'    EDUCATION: Home exercise program, " plan of care, activity modifications, pain management, and injury pathology       Assessment:   Patient tolerated today's session w/ expected muscle fatigue and a reported reduction in posterior knee/popliteal tightness/discomfort.  Demonstrates improvements in activity tolerance and LE strength through progression to closed chain quad strengthening w/o issue. Patient had difficulty w/ end range flexion due to familiar knee pain that improved w/ hamstring blocking. Reduction of familiar pain w/ hamstring blocking decreases the probability of concurrent mensicus injury w/ patellar chondromalacia. Patient is progressing and will benefit from ongoing PT services to continue hip and knee strengthening as tolerated to reach established goals to maximize functional mobility and return to PLOF.         Plan:  Manual for hamstrings. Continue hip and knee strengthening as tolerated w/ emphasis on hamstrings.     Goals: Set and discussed today  Active       Right knee pain       STGs       Start:  05/16/24    Expected End:  06/13/24       1. Patient will demonstrate independence w/ HEP to allow for self-management of symptoms  2. Patient will demonstrate increased strength during hip extension and flexion MMT to 4+/5 B to improve functional mobility and progress towards LTG and decrease knee strain.  3. Patient will demonstrate improved knee flexion ROM to 135 to equal unaffected side + pain free to demonstrate improved tolerance to mobility.  4. Patient will report a decrease in pain by at least 2 points to meet the MCID  5. Patient will perform non-reciprocal stair negotiation w/ a reported decrease in symptoms to progress towards long term goal           LTGs       Start:  05/16/24    Expected End:  07/11/24       1. Patient will report a decrease in pain w/ stair negotiation by at least 2 points to meet the MDC and improve performance.  2. Patient will be able to reciprocally negotiate stairs w/o a reproduction of  familiar symptoms to improve ADL performance and demonstrate an improved tolerance to load.  3. Patient will report an improved score on LEFS by at least 9 points to meet the MDC  4. Patient will demonstrate improved strength during hip flexion and extension MMT to 5/5 B to improve ADL performance and decrease knee strain.  5. Patient will tolerate a full 8 hour shift at work w/o an increase in familiar pain to demonstrate an improved tolerance to functional mobility.   6. Patient will tolerate a full shift at work w/o a reactive knee effusion.  7. Patient will improve knee flexion strength to 5/5 B during MMT to improve functional mobility.                   Plan of care was developed with input and agreement by the patient      Jeff Lyle PT

## 2024-06-14 ENCOUNTER — APPOINTMENT (OUTPATIENT)
Dept: PRIMARY CARE | Facility: CLINIC | Age: 59
End: 2024-06-14
Payer: COMMERCIAL

## 2024-06-17 ENCOUNTER — APPOINTMENT (OUTPATIENT)
Dept: PHYSICAL THERAPY | Facility: CLINIC | Age: 59
End: 2024-06-17
Payer: COMMERCIAL

## 2024-06-24 ENCOUNTER — APPOINTMENT (OUTPATIENT)
Dept: PHYSICAL THERAPY | Facility: CLINIC | Age: 59
End: 2024-06-24
Payer: COMMERCIAL

## 2024-07-01 ENCOUNTER — APPOINTMENT (OUTPATIENT)
Dept: PHYSICAL THERAPY | Facility: CLINIC | Age: 59
End: 2024-07-01
Payer: COMMERCIAL

## 2024-07-03 DIAGNOSIS — E11.9 TYPE 2 DIABETES MELLITUS WITHOUT COMPLICATION, WITH LONG-TERM CURRENT USE OF INSULIN (MULTI): ICD-10-CM

## 2024-07-03 DIAGNOSIS — Z79.4 TYPE 2 DIABETES MELLITUS WITHOUT COMPLICATION, WITH LONG-TERM CURRENT USE OF INSULIN (MULTI): ICD-10-CM

## 2024-07-08 ENCOUNTER — APPOINTMENT (OUTPATIENT)
Dept: PHYSICAL THERAPY | Facility: CLINIC | Age: 59
End: 2024-07-08
Payer: COMMERCIAL

## 2024-07-10 RX ORDER — INSULIN GLARGINE 100 [IU]/ML
INJECTION, SOLUTION SUBCUTANEOUS
Qty: 15 ML | Refills: 0 | Status: SHIPPED | OUTPATIENT
Start: 2024-07-10

## 2024-07-15 ENCOUNTER — APPOINTMENT (OUTPATIENT)
Dept: PHYSICAL THERAPY | Facility: CLINIC | Age: 59
End: 2024-07-15
Payer: COMMERCIAL

## 2024-07-19 ENCOUNTER — DOCUMENTATION (OUTPATIENT)
Dept: PHYSICAL THERAPY | Facility: CLINIC | Age: 59
End: 2024-07-19
Payer: COMMERCIAL

## 2024-07-19 ENCOUNTER — TELEPHONE (OUTPATIENT)
Dept: OPHTHALMOLOGY | Facility: CLINIC | Age: 59
End: 2024-07-19
Payer: COMMERCIAL

## 2024-07-19 NOTE — PROGRESS NOTES
Discharge Summary    Name: Andressa Saini  MRN: [id number]  : 1965  Date: 24    Discharge Summary: PT    Discharge Information: Date of discharge 2024, Date of last visit 6/10/2024, Date of evaluation 2024, Number of attended visits 2, Referred by Dr. 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 Treatment consisted of therapeutic exercise to improve hip and knee strength, as well as manual therapy to improve mobility and symptom control. Patient responded fairly well to PT, cancelled her 5 remaining appointments.      Discharge Status: Discharged to self care w/ HEP.     Rehab Discharge Reason: Failed to schedule and/or keep follow-up appointment(s)

## 2024-07-19 NOTE — TELEPHONE ENCOUNTER
Pt called about blurry vision and unable to see out of right eye. LVM at 11:26am to go to main campus ED.

## 2024-07-22 ENCOUNTER — LAB (OUTPATIENT)
Dept: LAB | Facility: LAB | Age: 59
End: 2024-07-22
Payer: COMMERCIAL

## 2024-07-22 ENCOUNTER — APPOINTMENT (OUTPATIENT)
Dept: PRIMARY CARE | Facility: CLINIC | Age: 59
End: 2024-07-22
Payer: COMMERCIAL

## 2024-07-22 VITALS — DIASTOLIC BLOOD PRESSURE: 84 MMHG | SYSTOLIC BLOOD PRESSURE: 152 MMHG

## 2024-07-22 DIAGNOSIS — I10 PRIMARY HYPERTENSION: ICD-10-CM

## 2024-07-22 DIAGNOSIS — E11.9 TYPE 2 DIABETES MELLITUS WITHOUT COMPLICATION, WITH LONG-TERM CURRENT USE OF INSULIN (MULTI): ICD-10-CM

## 2024-07-22 DIAGNOSIS — Z79.4 TYPE 2 DIABETES MELLITUS WITHOUT COMPLICATION, WITH LONG-TERM CURRENT USE OF INSULIN (MULTI): ICD-10-CM

## 2024-07-22 DIAGNOSIS — Z00.00 HEALTH CARE MAINTENANCE: ICD-10-CM

## 2024-07-22 DIAGNOSIS — H54.7 LOSS OF VISION: Primary | ICD-10-CM

## 2024-07-22 DIAGNOSIS — E78.2 MIXED HYPERLIPIDEMIA: ICD-10-CM

## 2024-07-22 LAB
EST. AVERAGE GLUCOSE BLD GHB EST-MCNC: 226 MG/DL
HBA1C MFR BLD: 9.5 %

## 2024-07-22 PROCEDURE — 36415 COLL VENOUS BLD VENIPUNCTURE: CPT

## 2024-07-22 PROCEDURE — 3077F SYST BP >= 140 MM HG: CPT | Performed by: INTERNAL MEDICINE

## 2024-07-22 PROCEDURE — 3079F DIAST BP 80-89 MM HG: CPT | Performed by: INTERNAL MEDICINE

## 2024-07-22 PROCEDURE — 3050F LDL-C >= 130 MG/DL: CPT | Performed by: INTERNAL MEDICINE

## 2024-07-22 PROCEDURE — 83036 HEMOGLOBIN GLYCOSYLATED A1C: CPT

## 2024-07-22 PROCEDURE — 3046F HEMOGLOBIN A1C LEVEL >9.0%: CPT | Performed by: INTERNAL MEDICINE

## 2024-07-22 PROCEDURE — 80053 COMPREHEN METABOLIC PANEL: CPT

## 2024-07-22 PROCEDURE — 99213 OFFICE O/P EST LOW 20 MIN: CPT | Performed by: INTERNAL MEDICINE

## 2024-07-22 PROCEDURE — 80061 LIPID PANEL: CPT

## 2024-07-22 NOTE — PROGRESS NOTES
Subjective   Patient ID: Andressa Saini is a 58 y.o. female who presents for No chief complaint on file..    HPI Follow-up visit no chest pain no shortness of breath no side effect with medication no polyuria polydipsia blood sugar is sometimes as low as 80s sometimes as high as 200 blood pressures have been higher she has diminished vision in her right eyes she had some type of surgery last year she has been trying to get into the ophthalmologist bowels normal no dysuria    Review of Systems    Objective   There were no vitals taken for this visit.    Physical Exam vital signs noted alert and oriented x 3 NCAT PERRLA EOMI nares without discharge OP benign no AC nodes no JVD no thyromegaly chest clear to auscultation CV regular rate and rhythm S1 is 2 extremities no clubbing cyanosis or edema trace edema intact distal pulses    Assessment/Plan     Impression diabetes mellitus type 2 vision change hypertension hyperlipidemia  Plan check A1c advised on long-term blood sugar test she is only taking one half of the insulin amount but is taking the glimepiride diet weight loss exercise advised on blood pressure blood pressure medicine set up with ophthalmology referral made for her visual assessment check hepatic panel lipid panel advised on liver enzymes and cholesterol and cholesterol medicine then rechecking 24 hours based on above TT 50 cc 26    LOV review prior advised on screening follow-up for physical

## 2024-07-23 ENCOUNTER — OFFICE VISIT (OUTPATIENT)
Dept: OPHTHALMOLOGY | Facility: CLINIC | Age: 59
End: 2024-07-23
Payer: COMMERCIAL

## 2024-07-23 DIAGNOSIS — E10.3591: Primary | ICD-10-CM

## 2024-07-23 DIAGNOSIS — H35.21 PROLIFERATIVE VITREORETINOPATHY OF RIGHT EYE: ICD-10-CM

## 2024-07-23 DIAGNOSIS — E78.2 MIXED HYPERLIPIDEMIA: ICD-10-CM

## 2024-07-23 DIAGNOSIS — E10.3532: ICD-10-CM

## 2024-07-23 LAB
ALBUMIN SERPL BCP-MCNC: 4.1 G/DL (ref 3.4–5)
ALP SERPL-CCNC: 86 U/L (ref 33–110)
ALT SERPL W P-5'-P-CCNC: 37 U/L (ref 7–45)
ANION GAP SERPL CALC-SCNC: 13 MMOL/L (ref 10–20)
AST SERPL W P-5'-P-CCNC: 25 U/L (ref 9–39)
BILIRUB SERPL-MCNC: 0.5 MG/DL (ref 0–1.2)
BUN SERPL-MCNC: 17 MG/DL (ref 6–23)
CALCIUM SERPL-MCNC: 9.9 MG/DL (ref 8.6–10.6)
CHLORIDE SERPL-SCNC: 101 MMOL/L (ref 98–107)
CHOLEST SERPL-MCNC: 251 MG/DL (ref 0–199)
CHOLESTEROL/HDL RATIO: 3.8
CO2 SERPL-SCNC: 31 MMOL/L (ref 21–32)
CREAT SERPL-MCNC: 0.86 MG/DL (ref 0.5–1.05)
EGFRCR SERPLBLD CKD-EPI 2021: 79 ML/MIN/1.73M*2
GLUCOSE SERPL-MCNC: 204 MG/DL (ref 74–99)
HDLC SERPL-MCNC: 66.1 MG/DL
LDLC SERPL CALC-MCNC: 164 MG/DL
NON HDL CHOLESTEROL: 185 MG/DL (ref 0–149)
POTASSIUM SERPL-SCNC: 4.5 MMOL/L (ref 3.5–5.3)
PROT SERPL-MCNC: 7.1 G/DL (ref 6.4–8.2)
SODIUM SERPL-SCNC: 140 MMOL/L (ref 136–145)
TRIGL SERPL-MCNC: 106 MG/DL (ref 0–149)
VLDL: 21 MG/DL (ref 0–40)

## 2024-07-23 PROCEDURE — 92134 CPTRZ OPH DX IMG PST SGM RTA: CPT | Performed by: OPTOMETRIST

## 2024-07-23 PROCEDURE — 99205 OFFICE O/P NEW HI 60 MIN: CPT | Performed by: OPTOMETRIST

## 2024-07-23 RX ORDER — ROSUVASTATIN CALCIUM 40 MG/1
40 TABLET, COATED ORAL DAILY
Qty: 90 TABLET | Refills: 1 | Status: SHIPPED | OUTPATIENT
Start: 2024-07-23

## 2024-07-23 ASSESSMENT — ENCOUNTER SYMPTOMS
HEMATOLOGIC/LYMPHATIC NEGATIVE: 0
ALLERGIC/IMMUNOLOGIC NEGATIVE: 0
PSYCHIATRIC NEGATIVE: 0
ENDOCRINE NEGATIVE: 0
GASTROINTESTINAL NEGATIVE: 0
MUSCULOSKELETAL NEGATIVE: 0
CARDIOVASCULAR NEGATIVE: 0
CONSTITUTIONAL NEGATIVE: 0
RESPIRATORY NEGATIVE: 0
NEUROLOGICAL NEGATIVE: 0
EYES NEGATIVE: 1

## 2024-07-23 ASSESSMENT — VISUAL ACUITY
METHOD: SNELLEN - LINEAR
OD_SC: 20/300
OS_SC+: -1
OS_SC: 20/80

## 2024-07-23 ASSESSMENT — CUP TO DISC RATIO
OD_RATIO: 0.2
OS_RATIO: 0.2

## 2024-07-23 ASSESSMENT — TONOMETRY
OD_IOP_MMHG: 18
IOP_METHOD: GOLDMANN APPLANATION
OS_IOP_MMHG: 18

## 2024-07-23 ASSESSMENT — CONF VISUAL FIELD
OS_INFERIOR_NASAL_RESTRICTION: 3
OD_INFERIOR_TEMPORAL_RESTRICTION: 0
OD_SUPERIOR_TEMPORAL_RESTRICTION: 0
OD_NORMAL: 1
OD_INFERIOR_NASAL_RESTRICTION: 0
OD_SUPERIOR_NASAL_RESTRICTION: 0
METHOD: COUNTING FINGERS
OS_INFERIOR_TEMPORAL_RESTRICTION: 0

## 2024-07-23 ASSESSMENT — EXTERNAL EXAM - LEFT EYE: OS_EXAM: NORMAL

## 2024-07-23 ASSESSMENT — SLIT LAMP EXAM - LIDS
COMMENTS: NORMAL
COMMENTS: NORMAL

## 2024-07-23 ASSESSMENT — EXTERNAL EXAM - RIGHT EYE: OD_EXAM: NORMAL

## 2024-07-23 NOTE — PROGRESS NOTES
Assessment/Plan   #Proliferative diabetic retinopathy of right eye associated with type 1 diabetes mellitus, unspecified proliferative retinopathy type (Multi)  #Proliferative vitreoretinopathy of right eye  - Patient reports previous ALFREDO and prophylactic PRP OD with [facility] (last ~11/2023)  - BCVA 20/300 OD today, worse from 20/40 in 9/2023 ([facility]), diffuse pre-retinal hemorrhages, NVD, PVR with possible SN detachment on exam and imaging, no PRP scars noted  - Discussed with patient that this is due to her longstanding diabetic eye disease and that she will likely need surgical intervention. Monocular precautions reviewed.  - Emphasized importance of tight glycemic control with goal Hb<7  - RTC retina tomorrow with Dr. Landry    #Left eye affected by proliferative diabetic retinopathy with traction retinal detachment not involving macula, associated with type 1 diabetes mellitus (Multi)  - Patient does not recall being told she had RD OS or PRP, states she had PPV OS 8/31/23 for VH with [facility]   - BCVA 20/80 today, improved from CF @ 3' in 9/2023 ([facility]), exam with NVD/scattered NVE, DBH, and , no holes/breaks/tears/RD  - Overall OS appears stable today

## 2024-07-24 ENCOUNTER — OFFICE VISIT (OUTPATIENT)
Dept: OPHTHALMOLOGY | Facility: CLINIC | Age: 59
End: 2024-07-24
Payer: COMMERCIAL

## 2024-07-24 ENCOUNTER — PREP FOR PROCEDURE (OUTPATIENT)
Dept: OPHTHALMOLOGY | Facility: HOSPITAL | Age: 59
End: 2024-07-24

## 2024-07-24 DIAGNOSIS — H43.11 VITREOUS HEMORRHAGE, RIGHT EYE (MULTI): Primary | ICD-10-CM

## 2024-07-24 DIAGNOSIS — E10.3591: Primary | ICD-10-CM

## 2024-07-24 DIAGNOSIS — E11.3511 PROLIFERATIVE DIABETIC RETINOPATHY OF RIGHT EYE WITH MACULAR EDEMA DETERMINED BY EXAMINATION ASSOCIATED WITH TYPE 2 DIABETES MELLITUS (MULTI): ICD-10-CM

## 2024-07-24 DIAGNOSIS — E11.3521 RIGHT EYE AFFECTED BY PROLIFERATIVE DIABETIC RETINOPATHY WITH TRACTION RETINAL DETACHMENT INVOLVING MACULA, ASSOCIATED WITH TYPE 2 DIABETES MELLITUS (MULTI): ICD-10-CM

## 2024-07-24 PROBLEM — E87.1 HYPONATREMIA: Status: ACTIVE | Noted: 2024-07-24

## 2024-07-24 PROBLEM — E78.49 ESSENTIAL FAMILIAL HYPERLIPIDEMIA: Status: ACTIVE | Noted: 2024-07-24

## 2024-07-24 PROCEDURE — 67028 INJECTION EYE DRUG: CPT | Mod: RIGHT SIDE

## 2024-07-24 PROCEDURE — 92134 CPTRZ OPH DX IMG PST SGM RTA: CPT

## 2024-07-24 PROCEDURE — 99214 OFFICE O/P EST MOD 30 MIN: CPT

## 2024-07-24 RX ORDER — PROPARACAINE HYDROCHLORIDE 5 MG/ML
1 SOLUTION/ DROPS OPHTHALMIC ONCE
OUTPATIENT
Start: 2024-07-24 | End: 2024-07-24

## 2024-07-24 RX ORDER — PHENYLEPHRINE HYDROCHLORIDE 25 MG/ML
1 SOLUTION/ DROPS OPHTHALMIC
OUTPATIENT
Start: 2024-07-24 | End: 2024-07-24

## 2024-07-24 RX ORDER — TROPICAMIDE 10 MG/ML
1 SOLUTION/ DROPS OPHTHALMIC
OUTPATIENT
Start: 2024-07-24 | End: 2024-07-24

## 2024-07-24 ASSESSMENT — TONOMETRY
OD_IOP_MMHG: 18
IOP_METHOD: GOLDMANN APPLANATION
OS_IOP_MMHG: 18

## 2024-07-24 ASSESSMENT — CONF VISUAL FIELD
OS_INFERIOR_NASAL_RESTRICTION: 0
METHOD: COUNTING FINGERS
OS_SUPERIOR_TEMPORAL_RESTRICTION: 0
OD_SUPERIOR_TEMPORAL_RESTRICTION: 0
OS_INFERIOR_TEMPORAL_RESTRICTION: 0
OS_NORMAL: 1
OS_SUPERIOR_NASAL_RESTRICTION: 0
OD_INFERIOR_TEMPORAL_RESTRICTION: 0
OD_SUPERIOR_NASAL_RESTRICTION: 0
OD_INFERIOR_NASAL_RESTRICTION: 0

## 2024-07-24 ASSESSMENT — ENCOUNTER SYMPTOMS
RESPIRATORY NEGATIVE: 0
ALLERGIC/IMMUNOLOGIC NEGATIVE: 0
CARDIOVASCULAR NEGATIVE: 0
NEUROLOGICAL NEGATIVE: 0
EYES NEGATIVE: 0
HEMATOLOGIC/LYMPHATIC NEGATIVE: 0
GASTROINTESTINAL NEGATIVE: 0
PSYCHIATRIC NEGATIVE: 0
ENDOCRINE NEGATIVE: 0
MUSCULOSKELETAL NEGATIVE: 0
CONSTITUTIONAL NEGATIVE: 0

## 2024-07-24 ASSESSMENT — EXTERNAL EXAM - RIGHT EYE: OD_EXAM: NORMAL

## 2024-07-24 ASSESSMENT — VISUAL ACUITY
OS_SC: 20/80
METHOD: SNELLEN - LINEAR
OD_SC: 20/300

## 2024-07-24 ASSESSMENT — SLIT LAMP EXAM - LIDS
COMMENTS: NORMAL
COMMENTS: NORMAL

## 2024-07-24 ASSESSMENT — CUP TO DISC RATIO
OD_RATIO: 0.2
OS_RATIO: 0.2

## 2024-07-24 ASSESSMENT — EXTERNAL EXAM - LEFT EYE: OS_EXAM: NORMAL

## 2024-07-24 NOTE — PROGRESS NOTES
Proliferative diabetic retinopathy without macular edema in type II DM, both eyesE11.3598  Proliferative diabetic retinopathy (PDR) with tractional RD involving macula, right eyeE11.3521  Vitreous hemorrhage, right eye   - Hx of Diabetes 22 years  - Most recent HbA1c = 9.5 (7/20/2024)  - ? Hx of HTN  - No Hx of kidney disease    - S/P  ALFREDO and PPV OD with [facility] (last ~8/2023)  - BCVA 20/300 OD today, worse from 20/40 in 9/2023 ([facility]), diffuse pre-retinal hemorrhages, NVD, PVR with possible SN detachment on exam and imaging, no PRP scars noted    - OCT 07/24/24   --- OD: abnormal foveal countor, and ERM with extrafoveal VMT.    --- OS: abnormal foveal countor, HE, mild retinal thickness     #Plan#:   - Emphasized the importance of blood glucose, BP, and cholestrol level control  - Discussed risks/benefits/alteranatives of treatment options. Patient concerned with faster rehabilitation and elects to proceed with surgery  - Will get PA for ALFREDO or RICKY  - RICKY sample OD was done    #Left eye affected by proliferative diabetic retinopathy with traction retinal detachment not involving macula, associated with type 2 diabetes mellitus (Multi)  - BCVA 20/80 today, improved from CF @ 3' in 9/2023 ([facility]), exam with NVD/scattered NVE, DBH, and , no holes/breaks/tears/RD  - Scattered NVE with pre-retinal heme nasally  - ? still active Nvs; May require fill in PRP or Injections in the future  - Observe for now

## 2024-07-24 NOTE — H&P
History Of Present Illness  Andressa Saini is a 58 y.o. female presenting with PDR with TRD and VH OD.     Past Medical History  She has no past medical history on file.    Surgical History  She has no past surgical history on file.     Social History  She reports that she has never smoked. She has never used smokeless tobacco. She reports that she does not currently use alcohol. She reports that she does not currently use drugs.     Allergies  Bupropion, Erythromycin, and Sulfa (sulfonamide antibiotics)    ROS  Patient denies ocular pain, redness, discharge, decreased vision, double vision, blind spots, flashes, or floaters.     Physical Exam  Not recorded          Assessment/Plan       PPV MP EL Air       I spent 30 minutes in the professional and overall care of this patient.      Taye Landry MD PhD

## 2024-07-25 DIAGNOSIS — Z98.890 POSTSURGICAL STATES FOLLOWING SURGERY OF EYE AND ADNEXA: Primary | ICD-10-CM

## 2024-07-25 RX ORDER — PREDNISOLONE ACETATE 10 MG/ML
SUSPENSION/ DROPS OPHTHALMIC
Qty: 5 ML | Refills: 1 | Status: SHIPPED | OUTPATIENT
Start: 2024-07-25

## 2024-07-25 RX ORDER — OFLOXACIN 3 MG/ML
SOLUTION/ DROPS OPHTHALMIC
Qty: 20 ML | Refills: 0 | Status: SHIPPED | OUTPATIENT
Start: 2024-07-25

## 2024-07-26 ENCOUNTER — ANESTHESIA EVENT (OUTPATIENT)
Dept: OPERATING ROOM | Facility: CLINIC | Age: 59
End: 2024-07-26
Payer: COMMERCIAL

## 2024-07-29 ENCOUNTER — ANESTHESIA (OUTPATIENT)
Dept: OPERATING ROOM | Facility: CLINIC | Age: 59
End: 2024-07-29
Payer: COMMERCIAL

## 2024-07-29 ENCOUNTER — HOSPITAL ENCOUNTER (OUTPATIENT)
Facility: CLINIC | Age: 59
Setting detail: OUTPATIENT SURGERY
Discharge: HOME | End: 2024-07-29
Payer: COMMERCIAL

## 2024-07-29 VITALS
HEIGHT: 62 IN | HEART RATE: 77 BPM | WEIGHT: 189.82 LBS | OXYGEN SATURATION: 96 % | SYSTOLIC BLOOD PRESSURE: 104 MMHG | BODY MASS INDEX: 34.93 KG/M2 | TEMPERATURE: 97.2 F | RESPIRATION RATE: 16 BRPM | DIASTOLIC BLOOD PRESSURE: 58 MMHG

## 2024-07-29 DIAGNOSIS — H43.11 VITREOUS HEMORRHAGE, RIGHT EYE (MULTI): ICD-10-CM

## 2024-07-29 DIAGNOSIS — E11.3521 RIGHT EYE AFFECTED BY PROLIFERATIVE DIABETIC RETINOPATHY WITH TRACTION RETINAL DETACHMENT INVOLVING MACULA, ASSOCIATED WITH TYPE 2 DIABETES MELLITUS (MULTI): Primary | ICD-10-CM

## 2024-07-29 PROBLEM — I10 HTN (HYPERTENSION): Status: ACTIVE | Noted: 2024-07-29

## 2024-07-29 PROBLEM — E11.9 DIABETES MELLITUS, TYPE 2 (MULTI): Status: ACTIVE | Noted: 2024-07-29

## 2024-07-29 PROBLEM — E87.1 HYPONATREMIA: Status: RESOLVED | Noted: 2024-07-24 | Resolved: 2024-07-29

## 2024-07-29 PROBLEM — E66.9 OBESITY: Status: ACTIVE | Noted: 2024-07-29

## 2024-07-29 LAB — GLUCOSE BLD MANUAL STRIP-MCNC: 137 MG/DL (ref 74–99)

## 2024-07-29 PROCEDURE — 3700000002 HC GENERAL ANESTHESIA TIME - EACH INCREMENTAL 1 MINUTE

## 2024-07-29 PROCEDURE — 2720000007 HC OR 272 NO HCPCS

## 2024-07-29 PROCEDURE — A67113 PR REPAIR COMPLEX RETINA DETACH VITRECTOMY AND MEMB PEEL

## 2024-07-29 PROCEDURE — 2500000004 HC RX 250 GENERAL PHARMACY W/ HCPCS (ALT 636 FOR OP/ED)

## 2024-07-29 PROCEDURE — 2500000004 HC RX 250 GENERAL PHARMACY W/ HCPCS (ALT 636 FOR OP/ED): Performed by: ANESTHESIOLOGY

## 2024-07-29 PROCEDURE — 67113 REPAIR RETINAL DETACH CPLX: CPT

## 2024-07-29 PROCEDURE — A67113 PR REPAIR COMPLEX RETINA DETACH VITRECTOMY AND MEMB PEEL: Performed by: STUDENT IN AN ORGANIZED HEALTH CARE EDUCATION/TRAINING PROGRAM

## 2024-07-29 PROCEDURE — 3600000008 HC OR TIME - EACH INCREMENTAL 1 MINUTE - PROCEDURE LEVEL THREE

## 2024-07-29 PROCEDURE — 3600000003 HC OR TIME - INITIAL BASE CHARGE - PROCEDURE LEVEL THREE

## 2024-07-29 PROCEDURE — 3700000001 HC GENERAL ANESTHESIA TIME - INITIAL BASE CHARGE

## 2024-07-29 PROCEDURE — 82947 ASSAY GLUCOSE BLOOD QUANT: CPT

## 2024-07-29 PROCEDURE — 7100000009 HC PHASE TWO TIME - INITIAL BASE CHARGE

## 2024-07-29 PROCEDURE — 67113 REPAIR RETINAL DETACH CPLX: CPT | Performed by: STUDENT IN AN ORGANIZED HEALTH CARE EDUCATION/TRAINING PROGRAM

## 2024-07-29 PROCEDURE — 7100000010 HC PHASE TWO TIME - EACH INCREMENTAL 1 MINUTE

## 2024-07-29 PROCEDURE — 2500000005 HC RX 250 GENERAL PHARMACY W/O HCPCS

## 2024-07-29 PROCEDURE — 2500000001 HC RX 250 WO HCPCS SELF ADMINISTERED DRUGS (ALT 637 FOR MEDICARE OP)

## 2024-07-29 RX ORDER — TROPICAMIDE 10 MG/ML
1 SOLUTION/ DROPS OPHTHALMIC
Status: COMPLETED | OUTPATIENT
Start: 2024-07-29 | End: 2024-07-29

## 2024-07-29 RX ORDER — LABETALOL HYDROCHLORIDE 5 MG/ML
INJECTION, SOLUTION INTRAVENOUS AS NEEDED
Status: DISCONTINUED | OUTPATIENT
Start: 2024-07-29 | End: 2024-07-29

## 2024-07-29 RX ORDER — HYDRALAZINE HYDROCHLORIDE 20 MG/ML
INJECTION INTRAMUSCULAR; INTRAVENOUS AS NEEDED
Status: DISCONTINUED | OUTPATIENT
Start: 2024-07-29 | End: 2024-07-29

## 2024-07-29 RX ORDER — DEXAMETHASONE SODIUM PHOSPHATE 10 MG/ML
INJECTION INTRAMUSCULAR; INTRAVENOUS AS NEEDED
Status: DISCONTINUED | OUTPATIENT
Start: 2024-07-29 | End: 2024-07-29 | Stop reason: HOSPADM

## 2024-07-29 RX ORDER — POVIDONE-IODINE 5 %
SOLUTION, NON-ORAL OPHTHALMIC (EYE) AS NEEDED
Status: DISCONTINUED | OUTPATIENT
Start: 2024-07-29 | End: 2024-07-29 | Stop reason: HOSPADM

## 2024-07-29 RX ORDER — LIDOCAINE IN NACL,ISO-OSMOT/PF 30 MG/3 ML
0.1 SYRINGE (ML) INJECTION ONCE
Status: DISCONTINUED | OUTPATIENT
Start: 2024-07-29 | End: 2024-07-29 | Stop reason: HOSPADM

## 2024-07-29 RX ORDER — LIDOCAINE HYDROCHLORIDE 20 MG/ML
INJECTION, SOLUTION INFILTRATION; PERINEURAL AS NEEDED
Status: DISCONTINUED | OUTPATIENT
Start: 2024-07-29 | End: 2024-07-29 | Stop reason: HOSPADM

## 2024-07-29 RX ORDER — PREDNISOLONE ACETATE 10 MG/ML
1 SUSPENSION/ DROPS OPHTHALMIC 4 TIMES DAILY
Qty: 5 ML | Refills: 3 | Status: SHIPPED | OUTPATIENT
Start: 2024-07-29 | End: 2024-08-28

## 2024-07-29 RX ORDER — ONDANSETRON HYDROCHLORIDE 2 MG/ML
4 INJECTION, SOLUTION INTRAVENOUS ONCE AS NEEDED
Status: DISCONTINUED | OUTPATIENT
Start: 2024-07-29 | End: 2024-07-29 | Stop reason: HOSPADM

## 2024-07-29 RX ORDER — OFLOXACIN 3 MG/ML
1 SOLUTION/ DROPS OPHTHALMIC 4 TIMES DAILY
Qty: 5 ML | Refills: 0 | Status: SHIPPED | OUTPATIENT
Start: 2024-07-29 | End: 2024-08-05

## 2024-07-29 RX ORDER — PROPARACAINE HYDROCHLORIDE 5 MG/ML
1 SOLUTION/ DROPS OPHTHALMIC ONCE
Status: COMPLETED | OUTPATIENT
Start: 2024-07-29 | End: 2024-07-29

## 2024-07-29 RX ORDER — SODIUM CHLORIDE, SODIUM LACTATE, POTASSIUM CHLORIDE, CALCIUM CHLORIDE 600; 310; 30; 20 MG/100ML; MG/100ML; MG/100ML; MG/100ML
100 INJECTION, SOLUTION INTRAVENOUS CONTINUOUS
Status: DISCONTINUED | OUTPATIENT
Start: 2024-07-29 | End: 2024-07-29 | Stop reason: HOSPADM

## 2024-07-29 RX ORDER — PHENYLEPHRINE HYDROCHLORIDE 25 MG/ML
1 SOLUTION/ DROPS OPHTHALMIC
Status: COMPLETED | OUTPATIENT
Start: 2024-07-29 | End: 2024-07-29

## 2024-07-29 RX ORDER — MIDAZOLAM HYDROCHLORIDE 1 MG/ML
INJECTION, SOLUTION INTRAMUSCULAR; INTRAVENOUS AS NEEDED
Status: DISCONTINUED | OUTPATIENT
Start: 2024-07-29 | End: 2024-07-29

## 2024-07-29 RX ORDER — CEFAZOLIN 1 G/1
INJECTION, POWDER, FOR SOLUTION INTRAVENOUS AS NEEDED
Status: DISCONTINUED | OUTPATIENT
Start: 2024-07-29 | End: 2024-07-29 | Stop reason: HOSPADM

## 2024-07-29 RX ORDER — TRIAMCINOLONE ACETONIDE 40 MG/ML
INJECTION, SUSPENSION INTRA-ARTICULAR; INTRAMUSCULAR AS NEEDED
Status: DISCONTINUED | OUTPATIENT
Start: 2024-07-29 | End: 2024-07-29 | Stop reason: HOSPADM

## 2024-07-29 RX ORDER — PROPOFOL 10 MG/ML
INJECTION, EMULSION INTRAVENOUS AS NEEDED
Status: DISCONTINUED | OUTPATIENT
Start: 2024-07-29 | End: 2024-07-29

## 2024-07-29 RX ORDER — WATER 1 ML/ML
IRRIGANT IRRIGATION AS NEEDED
Status: DISCONTINUED | OUTPATIENT
Start: 2024-07-29 | End: 2024-07-29 | Stop reason: HOSPADM

## 2024-07-29 ASSESSMENT — PAIN - FUNCTIONAL ASSESSMENT
PAIN_FUNCTIONAL_ASSESSMENT: 0-10

## 2024-07-29 ASSESSMENT — COLUMBIA-SUICIDE SEVERITY RATING SCALE - C-SSRS
6. HAVE YOU EVER DONE ANYTHING, STARTED TO DO ANYTHING, OR PREPARED TO DO ANYTHING TO END YOUR LIFE?: NO
2. HAVE YOU ACTUALLY HAD ANY THOUGHTS OF KILLING YOURSELF?: NO
1. IN THE PAST MONTH, HAVE YOU WISHED YOU WERE DEAD OR WISHED YOU COULD GO TO SLEEP AND NOT WAKE UP?: NO

## 2024-07-29 ASSESSMENT — PAIN SCALES - GENERAL
PAINLEVEL_OUTOF10: 0 - NO PAIN

## 2024-07-29 NOTE — DISCHARGE INSTRUCTIONS
Please keep the eye patched/shielded until your post op day 1 appointment tomorrow.    Appointment:  [facility], Taye Landry MD - please report to clinic at 10AM     Please do not perform any strenuous activity, bending below the waist, until you are cleared by your doctor.     Post Operative Medications: You will start your eye drops tomorrow after your post operative day 1 appointment   Antibiotic (Tan Cap) - 4 times per day              Steroid (Pink Cap) - 4 times per day   The order of drop instillation does not matter but you must wait atleast 5 minutes in between drops to ensure that the medications absorb properly

## 2024-07-29 NOTE — BRIEF OP NOTE
Date: 2024  [place] [facility] [place]    Name: Andressa Saini, : 1965, Age: 58 y.o., MRN: [id number], Sex: female    Diagnosis  Pre-op Diagnosis      * Vitreous hemorrhage, right eye (Multi) [H43.11]     * Right eye affected by proliferative diabetic retinopathy with traction retinal detachment involving macula, associated with type 2 diabetes mellitus (Multi) [E11.3521] Post-op Diagnosis     * Vitreous hemorrhage, right eye (Multi) [H43.11]     * Right eye affected by proliferative diabetic retinopathy with traction retinal detachment involving macula, associated with type 2 diabetes mellitus (Multi) [E11.3521]     Procedures  CPT: 50018 - 25 Gauge Pars Plana Vitrectomy, Membrane Peel, Endolaser - Right Eye      Surgeons      * aTye Landry - Primary    Resident/Fellow/Other Assistant:  Surgeons and Role:     * Kalia Torres MD - Assisting    Procedure Summary  Anesthesia: Monitor Anesthesia Care  ASA: III  Anesthesia Staff: Anesthesiologist: Sisi Lowry MD  C-AA: JIHAN Russell  ERNST: Pk Morgan  Estimated Blood Loss: 0 mL  Intra-op Medications:   Administrations occurring from 0925 to 1135 on 24:   Medication Name Total Dose   balanced salts (BSS) intraocular solution 15 mL   balanced salts (BSS Plus) intraocular solution 500 mL   sterile water irrigation solution 100 mL   ceFAZolin (Ancef) injection 250 mg   dexAMETHasone (Decadron) injection 5 mg   tobramycin-dexamethasone (Tobradex) ophthalmic ointment 0.5 inch   povidone-iodine 5 % ophthalmic solution 1 Application   triamcinolone acetonide (Kenalog-40) injection 40 mg   chondroitin sulf-sod hyaluron (Viscoat) intraocular injection 0.5 mL   lidocaine (Xylocaine) 20 mg/mL (2 %) injection 3 mL          Anesthesia Record               Intraprocedure I/O Totals          Intake    LR bolus 500.00 mL    Propofol Drip 0.00 mL    The total shown is the total volume documented since Anesthesia Start was filed.    Total  Intake 500 mL          Specimen: No specimens collected     Staff:   Circulator: Alexandra Harmon Person: Salma    Findings: vitreous hemorrhage, proliferative diabetic retinopathy with tractional retinal detachment involving the macula - right eye    Complications:  None; patient tolerated the procedure well.     Disposition: PACU - hemodynamically stable.  Condition: stable    Specimens Collected: No specimens collected    Attending Attestation:     A qualified resident physician was not available and the use of a skilled assistant surgeon, Kalia Torres MD, was required for the following portions of this case: 25 Gauge Pars Plana Vitrectomy with scleral depression, Membrane Peel, Endolaser - Right Eye      Taye Landry  Phone Number: [phone]

## 2024-07-29 NOTE — H&P
"History Of Present Illness  Andressa Saini is a 58 y.o. female presenting with PDR and vitreous hemorrhage in right eye here for PPV MP EL Air of right eye .     Past Medical History  Past Medical History:   Diagnosis Date    Arthritis     Hyperlipidemia     Hypertension     Nephrolithiasis     Type 2 diabetes mellitus (Multi)        Surgical History  Past Surgical History:   Procedure Laterality Date    DENTAL SURGERY      Surgery for TMJ    EYE SURGERY      VITRECTOMY Left 10/2023        Social History  She reports that she has been smoking cigarettes. She has been exposed to tobacco smoke. She has never used smokeless tobacco. She reports current alcohol use of about 1.0 standard drink of alcohol per week. She reports that she does not currently use drugs after having used the following drugs: Marijuana.    Family History  No family history on file.     Allergies  Bupropion, Erythromycin, and Sulfa (sulfonamide antibiotics)    Review of Systems   All other systems reviewed and are negative.       Physical Exam  HENT:      Head: Atraumatic.   Eyes:      Extraocular Movements: Extraocular movements intact.   Cardiovascular:      Rate and Rhythm: Normal rate.   Pulmonary:      Effort: Pulmonary effort is normal.   Abdominal:      Palpations: Abdomen is soft.   Neurological:      Mental Status: She is alert.          Last Recorded Vitals  Blood pressure (!) 220/107, pulse 88, temperature 36 °C (96.8 °F), temperature source Temporal, resp. rate 16, height 1.575 m (5' 2\"), weight 86.1 kg (189 lb 13.1 oz), SpO2 97%.    Relevant Results             Assessment/Plan   Active Problems:    Obesity    Diabetes mellitus, type 2 (Multi)    HTN (hypertension)    Vitreous hemorrhage, right eye (Multi)    Right eye affected by proliferative diabetic retinopathy with traction retinal detachment involving macula, associated with type 2 diabetes mellitus (Multi)      58 y.o. female presenting with PDR and vitreous hemorrhage in " right eye here for PPV MP EL Air of right eye .           Lamont Yu MD

## 2024-07-29 NOTE — ANESTHESIA PROCEDURE NOTES
Peripheral IV  Date/Time: 7/29/2024 8:42 AM      Placement  Needle size: 22 G  Laterality: right  Location: hand  Local anesthetic: none  Site prep: alcohol  Technique: anatomical landmarks  Attempts: 1

## 2024-07-29 NOTE — OP NOTE
Vitrectomy, endolaser, membrane peel (R) Operative Note     Date: 2024  [place] [facility] [place] [facility] [place]    Name: Andressa Saini, : 1965, Age: 58 y.o., MRN: [id number], Sex: female    Diagnosis  Pre-op Diagnosis      * Vitreous hemorrhage, right eye (Multi) [H43.11]     * Right eye affected by proliferative diabetic retinopathy with traction retinal detachment involving macula, associated with type 2 diabetes mellitus (Multi) [E11.3521] Post-op Diagnosis     * Vitreous hemorrhage, right eye (Multi) [H43.11]     * Right eye affected by proliferative diabetic retinopathy with traction retinal detachment involving macula, associated with type 2 diabetes mellitus (Multi) [E11.3521]     Procedures  CPT: 79945 - 25 Gauge Pars Plana Vitrectomy, Membrane Peel, Endolaser - Right Eye    Surgeons      * Taye Landry - Primary    Resident/Fellow/Other Assistant:  Surgeons and Role:     * Kalia Torres MD - Assisting    Procedure Summary  Anesthesia: Monitor Anesthesia Care  ASA: III  Anesthesia Staff: Anesthesiologist: Sisi Lowry MD  C-AA: JIHAN Russell  ERNST: Pk Morgan  Estimated Blood Loss: 0 mL  Intra-op Medications:   Administrations occurring from 0925 to 1135 on 24:   Medication Name Total Dose   balanced salts (BSS) intraocular solution 15 mL   balanced salts (BSS Plus) intraocular solution 500 mL   sterile water irrigation solution 100 mL   ceFAZolin (Ancef) injection 250 mg   dexAMETHasone (Decadron) injection 5 mg   tobramycin-dexamethasone (Tobradex) ophthalmic ointment 0.5 inch   povidone-iodine 5 % ophthalmic solution 1 Application   triamcinolone acetonide (Kenalog-40) injection 40 mg   chondroitin sulf-sod hyaluron (Viscoat) intraocular injection 0.5 mL   lidocaine (Xylocaine) 20 mg/mL (2 %) injection 3 mL          Anesthesia Record               Intraprocedure I/O Totals          Intake    LR bolus 500.00 mL    Propofol Drip 0.00 mL    The total shown is the total volume  documented since Anesthesia Start was filed.    Total Intake 500 mL          Specimen: No specimens collected     Staff:   Circulator: Alexandra Harmon Person: Salma    Drains and/or Catheters: * None in log *    Tourniquet Times: n/a    Implants: n/a    Findings: vitreous hemorrhage, proliferative diabetic retinopathy with tractional retinal detachment involving the macula - right eye.     Indications: Andressa Saini is an 58 y.o. female who is having surgery for Vitreous hemorrhage, right eye (Multi) [H43.11]  Right eye affected by proliferative diabetic retinopathy with traction retinal detachment involving macula, associated with type 2 diabetes mellitus (Multi) [E11.3521].     The patient was seen in the preoperative area. The risks, benefits, complications, treatment options, non-operative alternatives, expected recovery and outcomes were discussed with the patient. The possibilities of reaction to medication, pulmonary aspiration, injury to surrounding structures, bleeding, recurrent infection, the need for additional procedures, failure to diagnose a condition, and creating a complication requiring transfusion or operation were discussed with the patient. The patient concurred with the proposed plan, giving informed consent.  The site of surgery was properly noted/marked if necessary per policy. The patient has been actively warmed in preoperative area. Preoperative antibiotics are not indicated. Venous thrombosis prophylaxis are not indicated.    Procedure Details:     DATE OF SURGERY: 7/29/2024    SURGEON: Taye Landry MD    ASSISTANT: Kalia Torres MD    PREOPERATIVE DIAGNOSIS  Pre-Op Diagnosis Codes:      * Vitreous hemorrhage, right eye (Multi) [H43.11]     * Right eye affected by proliferative diabetic retinopathy with traction retinal detachment involving macula, associated with type 2 diabetes mellitus (Multi) [E11.3521]    POSTOPERATIVE DIAGNOSIS   Post-op Diagnosis     * Vitreous hemorrhage,  right eye (Multi) [H43.11]     * Right eye affected by proliferative diabetic retinopathy with traction retinal detachment involving macula, associated with type 2 diabetes mellitus (Multi) [E11.3521]    OPERATION PERFORMED  Repair of complex retinal detachment with:  25 -gauge pars plana vitrectomy, membrane peel, endolaser right eye    ANESTHESIA  Monitored anesthesia care with a standard block consisting of a 1:1 mixture of 4 %  lidocaine and 0.75% Marcaine with Wydase     FINDINGS  As detailed below     COMPLICATIONS  None     ESTIMATED BLOOD LOSS   Minimal     SPECIMENS REMOVED  None     JUSTIFICATION  Ms. Saini is a 58 y.o. female with  vitreous hemorrhage, proliferative diabetic retinopathy with tractional retinal detachment involving the macula - right eye. . This was causing decreased visual function. The risks, benefits, and alternatives to the procedure were discussed with the patient including the risk for vision loss, blindness, retinal detachment, infection, bleeding, pain, high or low pressure in the eye, double vision, no benefit, need for additional procedures, and droopy eyelid, amongst others. The patient had a full opportunity to have all questions answered in clinic prior to surgery. Afterwards, the patient requested that we perform surgery and signed the consent form.     PROCEDURE:   The patient was brought to the preoperative holding area where the correct eye was confirmed and marked. The patient then underwent intravenous sedation by the anesthesia team followed by a block as described above. The patient was then brought to the operating room where a secondary time-out was performed to identify the correct patient, eye, procedure, and any allergies. The eye was prepped and draped in the usual sterile ophthalmic fashion followed by placement of a lid speculum.     A 25-gauge trocar was placed in the inferotemporal quadrant 4 mm posterior to the limbus after conjunctival displacement.   A 4 mm infusion cannula was placed through this trocar, and the infusion cannula was confirmed in the vitreous cavity prior to turning it on. Two additional 25-gauge trocars were placed in a similar fashion in the superonasal and superotemporal quadrants 4  mm posterior to the limbus after conjunctival displacement.     At this time, a standard three-port pars plana vitrectomy was performed using the light pipe, the cutter, and the BIOM viewing system. A core vitreous dissection was performed vitreous hemorrhage evacuated. The retina was inspected and was found to have proliferative diabetic retinopathy with neovascularization elsewhere (NVE) along the superior and inferior arcades.  The macula and nasal periperapillary retina was elevated and partially detached secondary to fibrovascular proliferative membranes. A partial posterior vitreous detachment was noted with traction at the posterior fibrovascular membranes.  The jesus-posterior vitreous traction was released for 360 degrees, isolating the posterior fibrovascular membranes. A thorough peripheral vitreous dissection was performed.  Attention was then drawn to the fibrovascular membranes which were peeled and removed from the underlying retina using a combination of the vitrector and flex loop.  The membranes were removed very nicely without noting additional holes. Intravitreal diathermy was used to ensure hemostasis.     The endolaser probe was brought onto the field and was used to apply laser in a panretinal photocoagulation (PRP) fashion from the arcades to the ora elana; scleral depression was performed 360 degrees to improve visualization for laser application. Laser Settings: Power - 250mW, Duration: 100ms, Interval 300ms, Shot Count: 1029, Total Energy: 25.58 Joules. During scleral depression, no retinal tears or holes were noted.      The superonasal trocar was removed and the sclerotomy was sutured with an interrupted 7-0 Vicryl.  The  superotemporal trocar was removed and the sclerotomy was sutured with an interrupted 7-0 Vicryl. The infusion cannula and associated trocar were removed and the sclerotomy was sutured with an interrupted 7-0 Vicryl. The eye was confirmed to be at a physiologic level by digital palpation.    Subconjunctival injection of Cefazolin and Dexamethasone were administered. The lid speculum and drapes were removed. Antibiotic ointment was applied. The eye was patched and shielded. The patient tolerated the procedure well without any intraoperative or immediate postoperative complications. The patient was taken to the recovery room in good condition. The patient will follow up with Taye Landry MD in clinic on the following morning.        Complications:  None; patient tolerated the procedure well.    Disposition: PACU - hemodynamically stable.  Condition: stable         Additional Details:     A qualified resident physician was not available and the use of a skilled assistant surgeon, Kalia Torres MD, was required for  the following portions of this case: 25 Gauge Pars Plana Vitrectomy with scleral depression, Membrane Peel, Endolaser - Right Eye    Attending Attestation:     Taye Landry  Phone Number: [phone]

## 2024-07-29 NOTE — ANESTHESIA PREPROCEDURE EVALUATION
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, associated with type 2 diabetes mellitus (Multi)      Musculoskeletal   (+) Patella, chondromalacia, right       Clinical information reviewed:   Tobacco  Allergies  Meds   Med Hx  Surg Hx   Fam Hx  Soc Hx        NPO Detail:  NPO/Void Status  Date of Last Liquid: 07/28/24  Time of Last Liquid: 2330  Date of Last Solid: 07/28/24  Time of Last Solid: 2330      Vitals:    07/29/24 0835   BP: (!) 220/107   Pulse: 88   Resp: 16   Temp: 36 °C (96.8 °F)   SpO2: 97%       Past Surgical History:   Procedure Laterality Date    DENTAL SURGERY      Surgery for TMJ    EYE SURGERY      VITRECTOMY Left 10/2023     Past Medical History:   Diagnosis Date    Arthritis     Hyperlipidemia     Hypertension     Nephrolithiasis     Type 2 diabetes mellitus (Multi)        Current Facility-Administered Medications:     lactated Ringer's infusion, 100 mL/hr, intravenous, Continuous, Azalia Villagomez MD    lactated Ringer's infusion, 100 mL/hr, intravenous, Continuous, Azalia Villagomez MD, Last Rate: 100 mL/hr at 07/29/24 0843, 100 mL/hr at 07/29/24 0843    phenylephrine (Mydfrin) 2.5 % ophthalmic solution 1 drop, 1 drop, Right Eye, q5 min, Taye Landry MD PhD    proparacaine (Alcaine) 0.5 % ophthalmic solution 1 drop, 1 drop, Right Eye, Once, Taye Landry MD PhD    tropicamide (Mydriacyl) 1 % ophthalmic solution 1 drop, 1 drop, Right Eye, q5 min,  "Taye Landry MD PhD  Prior to Admission medications    Medication Sig Start Date End Date Taking? Authorizing Provider   BD Diana 2nd Gen Pen Needle 32 gauge x 5/32\" needle USE FOR INSULIN DAILY. 4/2/24  Yes Ousmane Yen MD   glimepiride (Amaryl) 4 mg tablet TAKE 1 TABLET IN THE MORNING AND 1 TABLET BEFORE BEDTIME 7/10/24  Yes Ousmane Yen MD   Lantus Solostar U-100 Insulin 100 unit/mL (3 mL) pen INJECT 100 UNITS UNDER THE SKIN ONCE DAILY.  Patient taking differently: 120 Units. 60 units AM and PM 7/10/24  Yes Ousmane Yen MD   levocetirizine (Xyzal) 5 mg tablet TAKE 1 TABLET ONCE DAILY IN THE EVENING (PLEASE FOLLOW UP) 12/27/23  Yes Ousmane Yen MD   ofloxacin (Ocuflox) 0.3 % ophthalmic solution Use one drop 4 times per day in the operated eye 7/25/24  Yes Taye Landry MD PhD   prednisoLONE acetate (Pred-Forte) 1 % ophthalmic suspension Begin next day after surgery  Use 4 times a day for 1 week, 3 times a day for 1 week, 2 times a day for 1 week, 1 time a day for 1 week then stop 7/25/24  Yes Taye Landry MD PhD   rosuvastatin (Crestor) 40 mg tablet Take 1 tablet (40 mg) by mouth once daily. 7/23/24  Yes Ousmane Yen MD   fluconazole (Diflucan) 150 mg tablet TAKE 1 TABLET BY MOUTH EVERY DAY  Patient not taking: Reported on 7/25/2024 4/16/24   Ousmane Yen MD   insulin glargine-yfgn (Semglee,insulin glarg-yfgn,Pen) 100 unit/mL (3 mL) Pen Inject 60 Units under the skin once daily in the morning. 11/23/23   Ousmane Yen MD   naproxen (Naprosyn) 500 mg tablet Take 1 tablet (500 mg) by mouth 2 times a day as needed (pain).  Patient not taking: Reported on 7/25/2024 11/4/23   Cecilia Geronimo MD   ondansetron ODT (Zofran-ODT) 4 mg disintegrating tablet Take 1 tablet (4 mg) by mouth every 8 hours if needed for nausea or vomiting.  Patient not taking: Reported on 7/25/2024 11/4/23   Cecilia Geronimo MD   pravastatin (Pravachol) 40 mg tablet Take one tablet daily 7/22/23   Ousmane Yen MD " "  rosuvastatin (Crestor) 20 mg tablet TAKE 1 TABLET DAILY 7/10/24 7/23/24  Ousmane Yen MD     Allergies   Allergen Reactions    Bupropion Unknown    Erythromycin Unknown    Sulfa (Sulfonamide Antibiotics) Hives and Itching     Social History     Tobacco Use    Smoking status: Every Day     Types: Cigarettes     Passive exposure: Past    Smokeless tobacco: Never    Tobacco comments:     Pt denies any illness in past 30 days     Denies any personal/family reaction or problems with anesthesia     Denies SOB with stairs or daily activity     Ambulates freely     Able to lay flat x 30 minutes   Substance Use Topics    Alcohol use: Yes     Alcohol/week: 1.0 standard drink of alcohol     Types: 1 Standard drinks or equivalent per week     Comment: monthly         Chemistry    Lab Results   Component Value Date/Time     07/22/2024 1304    K 4.5 07/22/2024 1304     07/22/2024 1304    CO2 31 07/22/2024 1304    BUN 17 07/22/2024 1304    CREATININE 0.86 07/22/2024 1304    Lab Results   Component Value Date/Time    CALCIUM 9.9 07/22/2024 1304    ALKPHOS 86 07/22/2024 1304    AST 25 07/22/2024 1304    ALT 37 07/22/2024 1304    BILITOT 0.5 07/22/2024 1304          Lab Results   Component Value Date/Time    WBC 8.3 11/04/2023 1506    HGB 14.6 11/04/2023 1506    HCT 45.7 11/04/2023 1506     11/04/2023 1506     No results found for: \"PROTIME\", \"PTT\", \"INR\"  No results found for this or any previous visit (from the past 4464 hour(s)).  No results found for this or any previous visit from the past 1095 days.           Physical Exam    Airway  Mallampati: III  TM distance: >3 FB  Neck ROM: full     Cardiovascular - normal exam     Dental - normal exam     Pulmonary - normal exam     Abdominal   (+) obese             Anesthesia Plan    History of general anesthesia?: yes  History of complications of general anesthesia?: no    ASA 3     MAC     intravenous induction   Postoperative administration of opioids is " intended.  Anesthetic plan and risks discussed with patient.  Use of blood products discussed with patient who.    Plan discussed with [facility]AA and attending.      Patient hypertensive in preop however states that her pressures have been normal at her PCP office which was only several days ago (confirmed in my chart that pressures were systolic 150s). Patient seems to be very anxious when we were discussing history and anesthesia plan. Patient denies any CP, SOB and vision changes. Plan to give patient some relaxation medication and see if her blood pressure improves. Discussed risks of elevated blood pressures and need for close monitoring at home. Patient is aware and understands risks. All questions answered to patient satisfaction.

## 2024-07-30 ENCOUNTER — OFFICE VISIT (OUTPATIENT)
Dept: OPHTHALMOLOGY | Facility: CLINIC | Age: 59
End: 2024-07-30
Payer: COMMERCIAL

## 2024-07-30 DIAGNOSIS — E10.3532: ICD-10-CM

## 2024-07-30 DIAGNOSIS — E10.3591: Primary | ICD-10-CM

## 2024-07-30 PROCEDURE — 99024 POSTOP FOLLOW-UP VISIT: CPT

## 2024-07-30 RX ORDER — OFLOXACIN 3 MG/ML
SOLUTION/ DROPS OPHTHALMIC
Qty: 5 ML | Refills: 0 | Status: SHIPPED | OUTPATIENT
Start: 2024-07-30

## 2024-07-30 ASSESSMENT — TONOMETRY
OD_IOP_MMHG: 20
IOP_METHOD: GOLDMANN APPLANATION

## 2024-07-30 ASSESSMENT — CUP TO DISC RATIO
OS_RATIO: 0.2
OD_RATIO: 0.2

## 2024-07-30 ASSESSMENT — CONF VISUAL FIELD
OS_SUPERIOR_TEMPORAL_RESTRICTION: 0
OD_INFERIOR_NASAL_RESTRICTION: 0
OS_INFERIOR_NASAL_RESTRICTION: 0
OD_SUPERIOR_NASAL_RESTRICTION: 0
OD_INFERIOR_TEMPORAL_RESTRICTION: 0
OD_SUPERIOR_TEMPORAL_RESTRICTION: 0
OS_SUPERIOR_NASAL_RESTRICTION: 0
OS_INFERIOR_TEMPORAL_RESTRICTION: 0
OS_NORMAL: 1
OD_NORMAL: 1

## 2024-07-30 ASSESSMENT — ENCOUNTER SYMPTOMS
ALLERGIC/IMMUNOLOGIC NEGATIVE: 0
CARDIOVASCULAR NEGATIVE: 0
RESPIRATORY NEGATIVE: 0
ENDOCRINE NEGATIVE: 0
GASTROINTESTINAL NEGATIVE: 0
EYES NEGATIVE: 0
HEMATOLOGIC/LYMPHATIC NEGATIVE: 0
NEUROLOGICAL NEGATIVE: 0
CONSTITUTIONAL NEGATIVE: 0
PSYCHIATRIC NEGATIVE: 0
MUSCULOSKELETAL NEGATIVE: 0

## 2024-07-30 ASSESSMENT — SLIT LAMP EXAM - LIDS
COMMENTS: NORMAL
COMMENTS: NORMAL

## 2024-07-30 ASSESSMENT — EXTERNAL EXAM - LEFT EYE: OS_EXAM: NORMAL

## 2024-07-30 ASSESSMENT — EXTERNAL EXAM - RIGHT EYE: OD_EXAM: NORMAL

## 2024-07-30 ASSESSMENT — VISUAL ACUITY
OD_SC+: -2
METHOD: SNELLEN - LINEAR
OD_SC: 20/60

## 2024-07-30 NOTE — PROGRESS NOTES
Proliferative diabetic retinopathy without macular edema in type II DM, both eyesE11.3592  Proliferative diabetic retinopathy (PDR) with tractional RD involving macula, right eyeE11.3521  Vitreous hemorrhage, right eye   - Hx of Diabetes 22 years  - Most recent HbA1c = 9.5 (7/20/2024)  - ? Hx of HTN  - No Hx of kidney disease    - S/P PPV MP EL 7/29/2024  - POD 1  - Doing well  - IOP WNL  - Retina is flat  - No signs of infection      Plan:  - Prednisolone and ofloxacin QID  - RTC in 1 week  - Endopthalmitis and RD precaution return precautions discussed, including pain, redness, decreased vision, and flashes/floaters.   - Instructions:  -- No heavy lifting/bending/straining  -- Wear shield while sleeping, glasses during day    #Left eye affected by proliferative diabetic retinopathy with traction retinal detachment not involving macula, associated with type 2 diabetes mellitus (Multi)  - S/P  ALFREDO and PPV OS with [facility] (last ~8/2023)  - BCVA 20/80 today, improved from CF @ 3' in 9/2023 ([facility]), exam with NVD/scattered NVE, DBH, and , no holes/breaks/tears/RD  - Scattered NVE with pre-retinal heme nasally  - ? still active Nvs; May require fill in PRP or Injections in the future  - Observe for now

## 2024-08-08 ENCOUNTER — APPOINTMENT (OUTPATIENT)
Dept: OPHTHALMOLOGY | Facility: CLINIC | Age: 59
End: 2024-08-08
Payer: COMMERCIAL

## 2024-08-08 DIAGNOSIS — E11.3593 PROLIFERATIVE DIABETIC RETINOPATHY OF BOTH EYES WITHOUT MACULAR EDEMA ASSOCIATED WITH TYPE 2 DIABETES MELLITUS (MULTI): Primary | ICD-10-CM

## 2024-08-08 PROCEDURE — 92134 CPTRZ OPH DX IMG PST SGM RTA: CPT

## 2024-08-08 PROCEDURE — 99024 POSTOP FOLLOW-UP VISIT: CPT

## 2024-08-08 ASSESSMENT — VISUAL ACUITY
METHOD: SNELLEN - LINEAR
OS_CC: 20/70+2
CORRECTION_TYPE: GLASSES
OD_CC: 20/50+2

## 2024-08-08 ASSESSMENT — TONOMETRY
OS_IOP_MMHG: 12
IOP_METHOD: GOLDMANN APPLANATION
OD_IOP_MMHG: 12

## 2024-08-08 ASSESSMENT — CUP TO DISC RATIO
OD_RATIO: 0.2
OS_RATIO: 0.2

## 2024-08-08 ASSESSMENT — SLIT LAMP EXAM - LIDS
COMMENTS: NORMAL
COMMENTS: NORMAL

## 2024-08-08 ASSESSMENT — EXTERNAL EXAM - RIGHT EYE: OD_EXAM: NORMAL

## 2024-08-08 ASSESSMENT — EXTERNAL EXAM - LEFT EYE: OS_EXAM: NORMAL

## 2024-08-08 ASSESSMENT — ENCOUNTER SYMPTOMS: EYES NEGATIVE: 1

## 2024-08-08 NOTE — PROGRESS NOTES
Proliferative diabetic retinopathy without macular edema in type II DM, both eyesE11.3599  Proliferative diabetic retinopathy (PDR) with tractional RD involving macula, right eyeE11.3521  Vitreous hemorrhage, right eye   - Hx of Diabetes 22 years  - Most recent HbA1c = 9.5 (7/20/2024)  - ? Hx of HTN  - No Hx of kidney disease    - S/P PPV MP EL 7/29/2024  - POW 4  - Doing well  - IOP WNL  - Retina is flat  - No signs of infection    OCT 08/08/24     - Right eye (OD): irregular foveal contour, scattered HE, No IRF or SRF    - Left eye (OS): irregular foveal contour, scattered HE, No IRF or SRF    Plan:  - Taper prednisolone QID x 1 week, TID x 1 week, then BID until next appt  - STOP ofloxacin   - RTC in 1 month  - Endopthalmitis and RD precaution return precautions discussed, including pain, redness, decreased vision, and flashes/floaters.     #Left eye affected by proliferative diabetic retinopathy with traction retinal detachment not involving macula, associated with type 2 diabetes mellitus (Multi)  - S/P  ALFREDO and PPV OS with [facility] (last ~8/2023)  - BCVA 20/70 today, improved from CF @ 3' in 9/2023 ([facility]), exam with NVD/scattered NVE, DBH, and , no holes/breaks/tears/RD  - Scattered NVE with pre-retinal heme nasally  - ? still active Nvs; May require fill in PRP or Injections in the future  - Observe for now

## 2024-08-08 NOTE — LETTER
August 8, 2024     Patient: Andressa Saini   YOB: 1965   Date of Visit: 8/8/2024       To Whom It May Concern:    It is my medical opinion that Andressa Saini may return back to work on 8/12/2024    If you have any questions or concerns, please don't hesitate to call.         Sincerely,        Taye Landry MD PhD    CC: No Recipients

## 2024-08-28 ENCOUNTER — TELEPHONE (OUTPATIENT)
Dept: PRIMARY CARE | Facility: CLINIC | Age: 59
End: 2024-08-28

## 2024-08-29 DIAGNOSIS — Z79.4 TYPE 2 DIABETES MELLITUS WITHOUT COMPLICATION, WITH LONG-TERM CURRENT USE OF INSULIN (MULTI): ICD-10-CM

## 2024-08-29 DIAGNOSIS — E11.9 TYPE 2 DIABETES MELLITUS WITHOUT COMPLICATION, WITH LONG-TERM CURRENT USE OF INSULIN (MULTI): ICD-10-CM

## 2024-08-29 RX ORDER — INSULIN GLARGINE 100 [IU]/ML
INJECTION, SOLUTION SUBCUTANEOUS
Qty: 30 ML | Refills: 1 | Status: SHIPPED | OUTPATIENT
Start: 2024-08-29

## 2024-09-05 ENCOUNTER — HOSPITAL ENCOUNTER (OUTPATIENT)
Dept: RADIOLOGY | Facility: CLINIC | Age: 59
Discharge: HOME | End: 2024-09-05
Payer: COMMERCIAL

## 2024-09-05 DIAGNOSIS — Z12.31 SCREENING MAMMOGRAM FOR BREAST CANCER: ICD-10-CM

## 2024-09-12 ENCOUNTER — OFFICE VISIT (OUTPATIENT)
Dept: OPHTHALMOLOGY | Facility: CLINIC | Age: 59
End: 2024-09-12
Payer: COMMERCIAL

## 2024-09-12 DIAGNOSIS — E11.3593 PROLIFERATIVE DIABETIC RETINOPATHY OF BOTH EYES WITHOUT MACULAR EDEMA ASSOCIATED WITH TYPE 2 DIABETES MELLITUS (MULTI): ICD-10-CM

## 2024-09-12 DIAGNOSIS — T81.41XA INFECTION INVOLVING SUTURE WITH ABSCESS: Primary | ICD-10-CM

## 2024-09-12 PROCEDURE — 92134 CPTRZ OPH DX IMG PST SGM RTA: CPT

## 2024-09-12 PROCEDURE — 99024 POSTOP FOLLOW-UP VISIT: CPT

## 2024-09-12 RX ORDER — PREDNISOLONE ACETATE 10 MG/ML
SUSPENSION/ DROPS OPHTHALMIC
Qty: 5 ML | Refills: 1 | Status: SHIPPED | OUTPATIENT
Start: 2024-09-12

## 2024-09-12 ASSESSMENT — VISUAL ACUITY
METHOD: SNELLEN - LINEAR
OS_SC: 20/80
OS_SC+: +2
OD_SC: 20/60
OD_SC+: +2

## 2024-09-12 ASSESSMENT — CONF VISUAL FIELD
OS_SUPERIOR_NASAL_RESTRICTION: 0
OS_INFERIOR_TEMPORAL_RESTRICTION: 0
OD_SUPERIOR_NASAL_RESTRICTION: 0
OS_SUPERIOR_TEMPORAL_RESTRICTION: 0
OD_NORMAL: 1
OD_INFERIOR_TEMPORAL_RESTRICTION: 0
OD_SUPERIOR_TEMPORAL_RESTRICTION: 0
OD_INFERIOR_NASAL_RESTRICTION: 0
OS_NORMAL: 1
OS_INFERIOR_NASAL_RESTRICTION: 0

## 2024-09-12 ASSESSMENT — ENCOUNTER SYMPTOMS
CONSTITUTIONAL NEGATIVE: 0
RESPIRATORY NEGATIVE: 0
ALLERGIC/IMMUNOLOGIC NEGATIVE: 0
MUSCULOSKELETAL NEGATIVE: 0
CARDIOVASCULAR NEGATIVE: 0
NEUROLOGICAL NEGATIVE: 0
PSYCHIATRIC NEGATIVE: 0
ENDOCRINE NEGATIVE: 0
HEMATOLOGIC/LYMPHATIC NEGATIVE: 0
EYES NEGATIVE: 1
GASTROINTESTINAL NEGATIVE: 0

## 2024-09-12 ASSESSMENT — EXTERNAL EXAM - LEFT EYE: OS_EXAM: NORMAL

## 2024-09-12 ASSESSMENT — CUP TO DISC RATIO
OD_RATIO: 0.2
OS_RATIO: 0.2

## 2024-09-12 ASSESSMENT — EXTERNAL EXAM - RIGHT EYE: OD_EXAM: NORMAL

## 2024-09-12 ASSESSMENT — TONOMETRY
IOP_METHOD: TONOPEN
OS_IOP_MMHG: 14
OD_IOP_MMHG: 15

## 2024-09-12 ASSESSMENT — SLIT LAMP EXAM - LIDS
COMMENTS: NORMAL
COMMENTS: NORMAL

## 2024-09-12 NOTE — PROGRESS NOTES
Proliferative diabetic retinopathy without macular edema in type II DM, both eyesE11.359  Proliferative diabetic retinopathy (PDR) with tractional RD involving macula, right eyeE11.3521  Vitreous hemorrhage, right eye   - Hx of Diabetes 22 years  - Most recent HbA1c = 9.5 (7/20/2024)  - ? Hx of HTN  - No Hx of kidney disease    - S/P PPV MP EL 7/29/2024  - POW 4  - Doing well  - IOP WNL  - Retina is flat  - No signs of infection    OCT 09/12/24     - Right eye (OD): irregular foveal contour, scattered HE, No IRF or SRF    - Left eye (OS): irregular foveal contour, scattered HE, No IRF or SRF    Plan:  - ? Infected scleral stitch; removed under betadine cover.   - Start prednisolone QID x 2 week, and ofloxacin   - RTC in 1 month  - Endopthalmitis and RD precaution return precautions discussed, including pain, redness, decreased vision, and flashes/floaters.     #Left eye affected by proliferative diabetic retinopathy with traction retinal detachment not involving macula, associated with type 2 diabetes mellitus (Multi)  - S/P  ALFREDO and PPV OS with [facility] (last ~8/2023)  - BCVA 20/70 today, improved from CF @ 3' in 9/2023 ([facility]), exam with NVD/scattered NVE, DBH, and , no holes/breaks/tears/RD  - NVE with pre-retinal heme nasally  - ? still active Nvs; May require fill in PRP or Injections in the future  - Observe for now

## 2024-09-26 ENCOUNTER — OFFICE VISIT (OUTPATIENT)
Dept: PRIMARY CARE | Facility: CLINIC | Age: 59
End: 2024-09-26
Payer: COMMERCIAL

## 2024-09-26 VITALS — SYSTOLIC BLOOD PRESSURE: 156 MMHG | DIASTOLIC BLOOD PRESSURE: 84 MMHG

## 2024-09-26 DIAGNOSIS — I10 PRIMARY HYPERTENSION: ICD-10-CM

## 2024-09-26 DIAGNOSIS — Z79.4 TYPE 2 DIABETES MELLITUS WITHOUT COMPLICATION, WITH LONG-TERM CURRENT USE OF INSULIN (MULTI): ICD-10-CM

## 2024-09-26 DIAGNOSIS — Z00.00 HEALTH CARE MAINTENANCE: Primary | ICD-10-CM

## 2024-09-26 DIAGNOSIS — E11.9 TYPE 2 DIABETES MELLITUS WITHOUT COMPLICATION, WITH LONG-TERM CURRENT USE OF INSULIN (MULTI): ICD-10-CM

## 2024-09-26 PROCEDURE — 3046F HEMOGLOBIN A1C LEVEL >9.0%: CPT | Performed by: INTERNAL MEDICINE

## 2024-09-26 PROCEDURE — 3079F DIAST BP 80-89 MM HG: CPT | Performed by: INTERNAL MEDICINE

## 2024-09-26 PROCEDURE — 99213 OFFICE O/P EST LOW 20 MIN: CPT | Performed by: INTERNAL MEDICINE

## 2024-09-26 PROCEDURE — 4010F ACE/ARB THERAPY RXD/TAKEN: CPT | Performed by: INTERNAL MEDICINE

## 2024-09-26 PROCEDURE — 3050F LDL-C >= 130 MG/DL: CPT | Performed by: INTERNAL MEDICINE

## 2024-09-26 PROCEDURE — 3077F SYST BP >= 140 MM HG: CPT | Performed by: INTERNAL MEDICINE

## 2024-09-26 RX ORDER — LOSARTAN POTASSIUM 50 MG/1
50 TABLET ORAL DAILY
Qty: 30 TABLET | Refills: 1 | Status: SHIPPED | OUTPATIENT
Start: 2024-09-26 | End: 2024-11-25

## 2024-09-26 NOTE — PROGRESS NOTES
Subjective   Patient ID: Andressa Saini is a 59 y.o. female who presents for No chief complaint on file..    HPI follow-up visit was getting hypoglycemic during her shifts with the 6060 insulin so it decreased it to 4040 was doing okay but noticed her sugars were higher during her surgery she had some elevated blood pressures she is overall was feeling somewhat tired was uncertain if it was from the blood pressure or low sugar was having some mild leg swelling she thought    Review of Systems    Objective   There were no vitals taken for this visit.    Physical Exam vital signs noted alert and oriented x 3 NCAT no JVD chest clear to auscultation CV regular rate and rhythm S1-S2 extremities no clubbing cyanosis or edema distal pulses    Assessment/Plan impression diabetes mellitus hypertension  Plan she would like to eventually have regular follow-up and testing continue work on diet weight loss exercise increase morning dose to 50 and stay the evening dose of 40 and then recheck the glycohemoglobin at her next visit upcoming okay to add losartan 50 mg p.o. daily watch salt in diet see EMR and recheck in 1 month    Would like EKG and review if she has had a CAC S given family history at that time  RAC

## 2024-09-27 ENCOUNTER — APPOINTMENT (OUTPATIENT)
Dept: OPHTHALMOLOGY | Facility: CLINIC | Age: 59
End: 2024-09-27
Payer: COMMERCIAL

## 2024-10-08 ENCOUNTER — APPOINTMENT (OUTPATIENT)
Dept: OPHTHALMOLOGY | Facility: CLINIC | Age: 59
End: 2024-10-08
Payer: COMMERCIAL

## 2024-10-10 ENCOUNTER — APPOINTMENT (OUTPATIENT)
Dept: OPHTHALMOLOGY | Facility: CLINIC | Age: 59
End: 2024-10-10
Payer: COMMERCIAL

## 2024-10-22 DIAGNOSIS — E11.9 TYPE 2 DIABETES MELLITUS WITHOUT COMPLICATION, WITH LONG-TERM CURRENT USE OF INSULIN (MULTI): ICD-10-CM

## 2024-10-22 DIAGNOSIS — Z79.4 TYPE 2 DIABETES MELLITUS WITHOUT COMPLICATION, WITH LONG-TERM CURRENT USE OF INSULIN (MULTI): ICD-10-CM

## 2024-10-23 ENCOUNTER — HOSPITAL ENCOUNTER (OUTPATIENT)
Dept: RADIOLOGY | Facility: CLINIC | Age: 59
Discharge: HOME | End: 2024-10-23
Payer: COMMERCIAL

## 2024-10-23 VITALS — HEIGHT: 62 IN | BODY MASS INDEX: 34.93 KG/M2 | WEIGHT: 189.82 LBS

## 2024-10-23 DIAGNOSIS — Z12.31 SCREENING MAMMOGRAM FOR BREAST CANCER: ICD-10-CM

## 2024-10-23 PROCEDURE — 77067 SCR MAMMO BI INCL CAD: CPT

## 2024-10-25 ENCOUNTER — OFFICE VISIT (OUTPATIENT)
Dept: OPHTHALMOLOGY | Facility: CLINIC | Age: 59
End: 2024-10-25
Payer: COMMERCIAL

## 2024-10-25 DIAGNOSIS — E11.3593 PROLIFERATIVE DIABETIC RETINOPATHY OF BOTH EYES WITHOUT MACULAR EDEMA ASSOCIATED WITH TYPE 2 DIABETES MELLITUS: Primary | ICD-10-CM

## 2024-10-25 DIAGNOSIS — E10.3532: ICD-10-CM

## 2024-10-25 ASSESSMENT — EXTERNAL EXAM - LEFT EYE: OS_EXAM: NORMAL

## 2024-10-25 ASSESSMENT — VISUAL ACUITY
METHOD: SNELLEN - LINEAR
OD_SC: 20/60
OS_SC: 20/70-

## 2024-10-25 ASSESSMENT — ENCOUNTER SYMPTOMS
PSYCHIATRIC NEGATIVE: 0
CARDIOVASCULAR NEGATIVE: 0
GASTROINTESTINAL NEGATIVE: 0
HEMATOLOGIC/LYMPHATIC NEGATIVE: 0
EYES NEGATIVE: 1
ENDOCRINE NEGATIVE: 0
MUSCULOSKELETAL NEGATIVE: 0
ALLERGIC/IMMUNOLOGIC NEGATIVE: 0
NEUROLOGICAL NEGATIVE: 0
CONSTITUTIONAL NEGATIVE: 0
RESPIRATORY NEGATIVE: 0

## 2024-10-25 ASSESSMENT — CONF VISUAL FIELD
OD_INFERIOR_TEMPORAL_RESTRICTION: 0
OS_INFERIOR_TEMPORAL_RESTRICTION: 0
OD_INFERIOR_NASAL_RESTRICTION: 0
OS_SUPERIOR_TEMPORAL_RESTRICTION: 0
OD_NORMAL: 1
OS_SUPERIOR_NASAL_RESTRICTION: 0
OS_NORMAL: 1
OD_SUPERIOR_NASAL_RESTRICTION: 0
OS_INFERIOR_NASAL_RESTRICTION: 0
OD_SUPERIOR_TEMPORAL_RESTRICTION: 0

## 2024-10-25 ASSESSMENT — SLIT LAMP EXAM - LIDS
COMMENTS: NORMAL
COMMENTS: NORMAL

## 2024-10-25 ASSESSMENT — TONOMETRY
OD_IOP_MMHG: 15
OS_IOP_MMHG: 16
IOP_METHOD: TONOPEN

## 2024-10-25 ASSESSMENT — CUP TO DISC RATIO
OS_RATIO: 0.2
OD_RATIO: 0.2

## 2024-10-25 ASSESSMENT — EXTERNAL EXAM - RIGHT EYE: OD_EXAM: NORMAL

## 2024-10-25 NOTE — PROGRESS NOTES
Proliferative diabetic retinopathy without macular edema in type II DM, both eyesE11.359  Proliferative diabetic retinopathy (PDR) with tractional RD involving macula, right eyeE11.3521  Vitreous hemorrhage, right eye   - Hx of Diabetes 22 years  - Most recent HbA1c = 9.5 (7/20/2024)  - ? Hx of HTN  - No Hx of kidney disease    - S/P PPV MP EL 7/29/2024  - POM 3  - Doing well  - IOP WNL  - Retina is flat  - No signs of infection    OCT 10/25/24     - Right eye (OD): irregular foveal contour, scattered HE, trace IRF or SRF    - Left eye (OS): irregular foveal contour, scattered HE, trace IRF or SRF    Plan:  - Stable  - No Ci DME  - RTC in 3 months    #Left eye affected by proliferative diabetic retinopathy with traction retinal detachment not involving macula, associated with type 2 diabetes mellitus (Multi)  - S/P  ALFREDO and PPV OS with [facility] (last ~8/2023)  - BCVA 20/70 today, improved from CF @ 3' in 9/2023 ([facility]), exam with NVD/scattered NVE, DBH, and , no holes/breaks/tears/RD  - NVE with pre-retinal heme nasally  - ? still active Nvs; May require fill in PRP or Injections in the future  - Observe for now

## 2024-10-26 RX ORDER — INSULIN GLARGINE 100 [IU]/ML
INJECTION, SOLUTION SUBCUTANEOUS
Qty: 30 ML | Refills: 1 | Status: SHIPPED | OUTPATIENT
Start: 2024-10-26

## 2024-10-29 ENCOUNTER — HOSPITAL ENCOUNTER (OUTPATIENT)
Dept: RADIOLOGY | Facility: EXTERNAL LOCATION | Age: 59
Discharge: HOME | End: 2024-10-29

## 2024-10-30 ENCOUNTER — APPOINTMENT (OUTPATIENT)
Dept: PRIMARY CARE | Facility: CLINIC | Age: 59
End: 2024-10-30
Payer: COMMERCIAL

## 2024-10-30 ENCOUNTER — LAB (OUTPATIENT)
Dept: LAB | Facility: LAB | Age: 59
End: 2024-10-30
Payer: COMMERCIAL

## 2024-10-30 VITALS — WEIGHT: 201 LBS | SYSTOLIC BLOOD PRESSURE: 134 MMHG | DIASTOLIC BLOOD PRESSURE: 78 MMHG | BODY MASS INDEX: 36.75 KG/M2

## 2024-10-30 DIAGNOSIS — Z79.4 TYPE 2 DIABETES MELLITUS WITHOUT COMPLICATION, WITH LONG-TERM CURRENT USE OF INSULIN (MULTI): ICD-10-CM

## 2024-10-30 DIAGNOSIS — E11.9 TYPE 2 DIABETES MELLITUS WITHOUT COMPLICATION, WITH LONG-TERM CURRENT USE OF INSULIN (MULTI): ICD-10-CM

## 2024-10-30 DIAGNOSIS — E78.2 MIXED HYPERLIPIDEMIA: ICD-10-CM

## 2024-10-30 DIAGNOSIS — I10 PRIMARY HYPERTENSION: ICD-10-CM

## 2024-10-30 DIAGNOSIS — Z00.00 HEALTH CARE MAINTENANCE: Primary | ICD-10-CM

## 2024-10-30 DIAGNOSIS — M17.0 PRIMARY OSTEOARTHRITIS OF BOTH KNEES: ICD-10-CM

## 2024-10-30 LAB
EST. AVERAGE GLUCOSE BLD GHB EST-MCNC: 203 MG/DL
HBA1C MFR BLD: 8.7 %

## 2024-10-30 PROCEDURE — 36415 COLL VENOUS BLD VENIPUNCTURE: CPT

## 2024-10-30 PROCEDURE — 83036 HEMOGLOBIN GLYCOSYLATED A1C: CPT

## 2024-10-30 PROCEDURE — 3052F HG A1C>EQUAL 8.0%<EQUAL 9.0%: CPT | Performed by: INTERNAL MEDICINE

## 2024-10-30 PROCEDURE — 3078F DIAST BP <80 MM HG: CPT | Performed by: INTERNAL MEDICINE

## 2024-10-30 PROCEDURE — 3050F LDL-C >= 130 MG/DL: CPT | Performed by: INTERNAL MEDICINE

## 2024-10-30 PROCEDURE — 99214 OFFICE O/P EST MOD 30 MIN: CPT | Performed by: INTERNAL MEDICINE

## 2024-10-30 PROCEDURE — 4010F ACE/ARB THERAPY RXD/TAKEN: CPT | Performed by: INTERNAL MEDICINE

## 2024-10-30 PROCEDURE — 3075F SYST BP GE 130 - 139MM HG: CPT | Performed by: INTERNAL MEDICINE

## 2024-10-30 RX ORDER — PEN NEEDLE, DIABETIC 32GX 5/32"
NEEDLE, DISPOSABLE MISCELLANEOUS
Qty: 100 EACH | Refills: 1 | Status: SHIPPED | OUTPATIENT
Start: 2024-10-30

## 2024-11-07 ENCOUNTER — APPOINTMENT (OUTPATIENT)
Dept: OPHTHALMOLOGY | Facility: CLINIC | Age: 59
End: 2024-11-07
Payer: COMMERCIAL

## 2024-11-20 DIAGNOSIS — I10 PRIMARY HYPERTENSION: ICD-10-CM

## 2024-11-23 RX ORDER — LOSARTAN POTASSIUM 50 MG/1
50 TABLET ORAL DAILY
Qty: 30 TABLET | Refills: 1 | Status: SHIPPED | OUTPATIENT
Start: 2024-11-23

## 2024-12-10 ENCOUNTER — OFFICE VISIT (OUTPATIENT)
Dept: PRIMARY CARE | Facility: CLINIC | Age: 59
End: 2024-12-10
Payer: COMMERCIAL

## 2024-12-10 VITALS — DIASTOLIC BLOOD PRESSURE: 77 MMHG | SYSTOLIC BLOOD PRESSURE: 130 MMHG

## 2024-12-10 DIAGNOSIS — Z79.4 TYPE 2 DIABETES MELLITUS WITHOUT COMPLICATION, WITH LONG-TERM CURRENT USE OF INSULIN (MULTI): ICD-10-CM

## 2024-12-10 DIAGNOSIS — U09.9 POST COVID-19 CONDITION, UNSPECIFIED: ICD-10-CM

## 2024-12-10 DIAGNOSIS — E11.9 TYPE 2 DIABETES MELLITUS WITHOUT COMPLICATION, WITH LONG-TERM CURRENT USE OF INSULIN (MULTI): ICD-10-CM

## 2024-12-10 DIAGNOSIS — Z00.00 HEALTH CARE MAINTENANCE: Primary | ICD-10-CM

## 2024-12-10 DIAGNOSIS — I10 PRIMARY HYPERTENSION: ICD-10-CM

## 2024-12-10 PROCEDURE — 3052F HG A1C>EQUAL 8.0%<EQUAL 9.0%: CPT | Performed by: INTERNAL MEDICINE

## 2024-12-10 PROCEDURE — 3078F DIAST BP <80 MM HG: CPT | Performed by: INTERNAL MEDICINE

## 2024-12-10 PROCEDURE — 99213 OFFICE O/P EST LOW 20 MIN: CPT | Performed by: INTERNAL MEDICINE

## 2024-12-10 PROCEDURE — 4010F ACE/ARB THERAPY RXD/TAKEN: CPT | Performed by: INTERNAL MEDICINE

## 2024-12-10 PROCEDURE — 3050F LDL-C >= 130 MG/DL: CPT | Performed by: INTERNAL MEDICINE

## 2024-12-10 PROCEDURE — 3075F SYST BP GE 130 - 139MM HG: CPT | Performed by: INTERNAL MEDICINE

## 2024-12-10 RX ORDER — DOXYCYCLINE 100 MG/1
100 CAPSULE ORAL 2 TIMES DAILY
Qty: 10 CAPSULE | Refills: 0 | Status: SHIPPED | OUTPATIENT
Start: 2024-12-10 | End: 2024-12-15

## 2024-12-10 NOTE — PROGRESS NOTES
Subjective   Patient ID: Andressa Saini is a 59 y.o. female who presents for Cough.    HPI follow-up visit and sick visit no polyuria polydipsia around Thanksgiving contracted COVID but did not know it and did not test until later and then was not feeling well had diarrhea nausea was not eating sought no treatment at that time now 2-1/2 weeks later is developing some sinus with bilateral ear fullness and copious thick runny nose no chest pain no shortness of breath vital signs noted alert and oriented x 3 NCAT no coryza nares heavy gray discharge OP benign erythema no AC nodes TM normal opaque bilateral EAC clear bilateral no JVD chest clear to auscultation and percussion CV regular rate and rhythm S1-S2 no murmur gallop or rub extremities no clubbing cyanosis or edema normal distal pulses    Review of Systems    Objective   There were no vitals taken for this visit.    Physical Exam    Assessment/Plan    impression status post COVID sinus blood pressure?  Diabetes mellitus  Plan continue with current medications diet weight loss exercise Mucinex twice daily okay for doxycycline 100 mg p.o. twice daily #10 refill 0 see EMR FMLA paperwork for the time that she was out for her illness and then recheck for regular visit went well and follow-up on blood work and ophthalmology

## 2024-12-15 DIAGNOSIS — E11.9 TYPE 2 DIABETES MELLITUS WITHOUT COMPLICATION, WITH LONG-TERM CURRENT USE OF INSULIN (MULTI): ICD-10-CM

## 2024-12-15 DIAGNOSIS — Z79.4 TYPE 2 DIABETES MELLITUS WITHOUT COMPLICATION, WITH LONG-TERM CURRENT USE OF INSULIN (MULTI): ICD-10-CM

## 2024-12-20 RX ORDER — INSULIN GLARGINE 100 [IU]/ML
INJECTION, SOLUTION SUBCUTANEOUS
Qty: 15 ML | Refills: 1 | Status: SHIPPED | OUTPATIENT
Start: 2024-12-20

## 2025-01-07 ENCOUNTER — APPOINTMENT (OUTPATIENT)
Dept: PRIMARY CARE | Facility: CLINIC | Age: 60
End: 2025-01-07
Payer: COMMERCIAL

## 2025-01-07 VITALS — SYSTOLIC BLOOD PRESSURE: 133 MMHG | DIASTOLIC BLOOD PRESSURE: 75 MMHG

## 2025-01-07 DIAGNOSIS — I10 PRIMARY HYPERTENSION: ICD-10-CM

## 2025-01-07 DIAGNOSIS — M17.0 PRIMARY OSTEOARTHRITIS OF BOTH KNEES: Primary | ICD-10-CM

## 2025-01-07 PROCEDURE — 3075F SYST BP GE 130 - 139MM HG: CPT | Performed by: INTERNAL MEDICINE

## 2025-01-07 PROCEDURE — 99213 OFFICE O/P EST LOW 20 MIN: CPT | Performed by: INTERNAL MEDICINE

## 2025-01-07 PROCEDURE — 3078F DIAST BP <80 MM HG: CPT | Performed by: INTERNAL MEDICINE

## 2025-01-07 PROCEDURE — 4010F ACE/ARB THERAPY RXD/TAKEN: CPT | Performed by: INTERNAL MEDICINE

## 2025-01-07 RX ORDER — MELOXICAM 7.5 MG/1
7.5 TABLET ORAL 2 TIMES DAILY PRN
Qty: 50 TABLET | Refills: 0 | Status: SHIPPED | OUTPATIENT
Start: 2025-01-07 | End: 2026-01-07

## 2025-01-07 NOTE — PROGRESS NOTES
Subjective   Patient ID: Andressa Saini is a 59 y.o. female who presents for No chief complaint on file..    HPI follow-up visit and sick visit no chest pain no shortness of breath     impression  djd knee htn  previously diagnosed with chondromalacia patella DJD knee now with lower extremity nonpitting swelling mild over the past week had developed some swelling of her legs has been hurting in her hips and knees no injury had tried some Motrin but keep up on that long ago last year she had a knee injury at the orthopedics and is meloxicam and this was helpful no polyuria polydipsia otherwise has been doing okay    Review of Systems    Objective   There were no vitals taken for this visit.    Physical Exam  Vital signs noted alert and oriented x 3 NCAT no JVD chest clear to auscultation cor regular rate and rhythm S1-S2 extremities no clubbing cyanosis there is trace nonpitting edema intact distal pulses musculoskeletal DJD changes at the knee mild with crepitus at the patella  Assessment/Plan    difficulty rising from chair  Plan okay for meloxicam prescriptions 0.5 mg p.o. twice daily see EMR avoid other NSAIDs while taking this may use topical Voltaren gel Tylenol interspersed in between dosing intervals of meloxicam has upcoming appointment with rheumatologist as previously seen orthopedics and physical therapy continue to hydrate well and monitor blood sugars continue with bp monitoring diet and medication, and recheck for regular visit [facility] if not improving

## 2025-01-17 DIAGNOSIS — I10 PRIMARY HYPERTENSION: ICD-10-CM

## 2025-01-24 ENCOUNTER — APPOINTMENT (OUTPATIENT)
Dept: OPHTHALMOLOGY | Facility: CLINIC | Age: 60
End: 2025-01-24
Payer: COMMERCIAL

## 2025-01-25 RX ORDER — LOSARTAN POTASSIUM 50 MG/1
50 TABLET ORAL DAILY
Qty: 30 TABLET | Refills: 1 | Status: SHIPPED | OUTPATIENT
Start: 2025-01-25

## 2025-02-05 ENCOUNTER — HOSPITAL ENCOUNTER (OUTPATIENT)
Dept: RADIOLOGY | Facility: HOSPITAL | Age: 60
Discharge: HOME | End: 2025-02-05
Payer: COMMERCIAL

## 2025-02-05 DIAGNOSIS — E11.9 TYPE 2 DIABETES MELLITUS WITHOUT COMPLICATION, WITH LONG-TERM CURRENT USE OF INSULIN (MULTI): ICD-10-CM

## 2025-02-05 DIAGNOSIS — Z00.00 HEALTH CARE MAINTENANCE: ICD-10-CM

## 2025-02-05 DIAGNOSIS — I10 PRIMARY HYPERTENSION: ICD-10-CM

## 2025-02-05 DIAGNOSIS — Z79.4 TYPE 2 DIABETES MELLITUS WITHOUT COMPLICATION, WITH LONG-TERM CURRENT USE OF INSULIN (MULTI): ICD-10-CM

## 2025-02-05 PROCEDURE — 75571 CT HRT W/O DYE W/CA TEST: CPT

## 2025-02-14 ENCOUNTER — APPOINTMENT (OUTPATIENT)
Dept: RHEUMATOLOGY | Facility: CLINIC | Age: 60
End: 2025-02-14
Payer: COMMERCIAL

## 2025-02-14 VITALS
HEART RATE: 87 BPM | HEIGHT: 62 IN | DIASTOLIC BLOOD PRESSURE: 89 MMHG | OXYGEN SATURATION: 97 % | TEMPERATURE: 96.5 F | SYSTOLIC BLOOD PRESSURE: 181 MMHG | WEIGHT: 210.6 LBS | BODY MASS INDEX: 38.76 KG/M2

## 2025-02-14 DIAGNOSIS — M54.50 LOW BACK PAIN, UNSPECIFIED BACK PAIN LATERALITY, UNSPECIFIED CHRONICITY, UNSPECIFIED WHETHER SCIATICA PRESENT: ICD-10-CM

## 2025-02-14 DIAGNOSIS — M25.50 ARTHRALGIA, UNSPECIFIED JOINT: Primary | ICD-10-CM

## 2025-02-14 PROCEDURE — 3077F SYST BP >= 140 MM HG: CPT | Performed by: INTERNAL MEDICINE

## 2025-02-14 PROCEDURE — 3079F DIAST BP 80-89 MM HG: CPT | Performed by: INTERNAL MEDICINE

## 2025-02-14 PROCEDURE — 3008F BODY MASS INDEX DOCD: CPT | Performed by: INTERNAL MEDICINE

## 2025-02-14 PROCEDURE — 4010F ACE/ARB THERAPY RXD/TAKEN: CPT | Performed by: INTERNAL MEDICINE

## 2025-02-14 PROCEDURE — 99204 OFFICE O/P NEW MOD 45 MIN: CPT | Performed by: INTERNAL MEDICINE

## 2025-02-14 ASSESSMENT — PAIN SCALES - GENERAL: PAINLEVEL_OUTOF10: 6

## 2025-02-14 NOTE — PROGRESS NOTES
Subjective   Patient ID: Andressa Saini is a 59 y.o. female who presents for Establish Care.    HPI  60 yo female with Diabetes and Proliferative diabetic retinopathy  She had an injury at right knee  Ortho saw her and diagnosed with chondromalacia  She saw PT and did not improve  Also, she is having ankle and hip pain  She has noted swelling at both ankles  PCP gave her NSAID and helped her swelling  She has been using NSAID sometimes  Today, she reports pain and swelling in her ankles and knees  Denies any hand joint problem, psoriasis  Does not have any dry eyes, dry  mouth, IBD  She is having LBP, but does not look like inflammatory LBP  NSAID does not help her LBP  Denies any heel pain, no h/o gout  She is smoking, occasional alcohol  Family h/o did not reveal any inflammatory arthritis  ROS  Joint pain in hands: negative   Joint swelling: negative  Morning stiffness and duration: negative   strength: normal  Oral ulcer: negative  Genital ulcer: negative  Raynaud phenomenon: negative  Chest pain/dyspnea: negative  Low back pain: negative  Visual problem: negative  Dry eyes/dry mouth: negative  Skin rash/scaling/psoriasis: negative       Objective     PEXAM  VS reviewed, WNL  General: Alert, no distress   HEENT: Normocephalic/atraumatic, No alopecia. PERRLA. Sclera white, conjunctiva pink, no malar rash. no oral or nasal ulcer. Oral cavity pink and moist, no erythema or exudate, dentition good.   Neck: supple  Respiratory: CTA B, no adventitious breath sounds  Cardiac: RRR, no murmurs, carotid, or bruits  Abdominal: symmetrical, soft, non-tender, non-distended, normoactive BSx4 quadrants, no CVA tenderness or suprapubic tenderness  MSK: Joints of upper and lower extremities were assessed for synovitis and ROM.    Today she has no evidence of synovitis in the joints of her hands or wrists, tender joint count 0, swollen joint count 0   Extremities: no clubbing, no cyanosis, no edema  Skin: Skin warm and  moist.   Neuro: non-focal, Strength 5/5 throughout. Normal gait. No cerebellar pathologic exam     Assessment/Plan   60 yo female with diabetes  She reports knee, ankle and LBP  Ortho diagnosed with chondomalacia patella  PT did not help  PExam did not reveal any inflammatory arthritis  I think she has mechanical knee problem, no inflammatory arthritis  -will see her blood work and pelvis x-rays

## 2025-02-20 DIAGNOSIS — E11.9 TYPE 2 DIABETES MELLITUS WITHOUT COMPLICATION, WITH LONG-TERM CURRENT USE OF INSULIN (MULTI): ICD-10-CM

## 2025-02-20 DIAGNOSIS — Z79.4 TYPE 2 DIABETES MELLITUS WITHOUT COMPLICATION, WITH LONG-TERM CURRENT USE OF INSULIN (MULTI): ICD-10-CM

## 2025-02-26 RX ORDER — INSULIN GLARGINE 100 [IU]/ML
INJECTION, SOLUTION SUBCUTANEOUS
Qty: 15 ML | Refills: 1 | Status: SHIPPED | OUTPATIENT
Start: 2025-02-26

## 2025-03-19 DIAGNOSIS — I10 PRIMARY HYPERTENSION: ICD-10-CM

## 2025-03-22 RX ORDER — LOSARTAN POTASSIUM 50 MG/1
50 TABLET ORAL DAILY
Qty: 30 TABLET | Refills: 1 | Status: SHIPPED | OUTPATIENT
Start: 2025-03-22

## 2025-04-23 DIAGNOSIS — Z79.4 TYPE 2 DIABETES MELLITUS WITHOUT COMPLICATION, WITH LONG-TERM CURRENT USE OF INSULIN: ICD-10-CM

## 2025-04-23 DIAGNOSIS — E11.9 TYPE 2 DIABETES MELLITUS WITHOUT COMPLICATION, WITH LONG-TERM CURRENT USE OF INSULIN: ICD-10-CM

## 2025-04-26 RX ORDER — INSULIN GLARGINE 100 [IU]/ML
INJECTION, SOLUTION SUBCUTANEOUS
Qty: 30 ML | Refills: 1 | Status: SHIPPED | OUTPATIENT
Start: 2025-04-26

## 2025-05-02 DIAGNOSIS — Z79.4 TYPE 2 DIABETES MELLITUS WITHOUT COMPLICATION, WITH LONG-TERM CURRENT USE OF INSULIN: ICD-10-CM

## 2025-05-02 DIAGNOSIS — E11.9 TYPE 2 DIABETES MELLITUS WITHOUT COMPLICATION, WITH LONG-TERM CURRENT USE OF INSULIN: ICD-10-CM

## 2025-05-03 RX ORDER — PEN NEEDLE, DIABETIC 32GX 5/32"
NEEDLE, DISPOSABLE MISCELLANEOUS
Qty: 100 EACH | Refills: 1 | Status: SHIPPED | OUTPATIENT
Start: 2025-05-03

## 2025-05-21 ENCOUNTER — APPOINTMENT (OUTPATIENT)
Dept: RADIOLOGY | Facility: HOSPITAL | Age: 60
End: 2025-05-21
Payer: COMMERCIAL

## 2025-05-21 ENCOUNTER — HOSPITAL ENCOUNTER (EMERGENCY)
Facility: HOSPITAL | Age: 60
Discharge: HOME | End: 2025-05-21
Payer: COMMERCIAL

## 2025-05-21 VITALS
OXYGEN SATURATION: 99 % | BODY MASS INDEX: 34.04 KG/M2 | HEIGHT: 62 IN | WEIGHT: 185 LBS | TEMPERATURE: 96.6 F | SYSTOLIC BLOOD PRESSURE: 151 MMHG | RESPIRATION RATE: 18 BRPM | HEART RATE: 71 BPM | DIASTOLIC BLOOD PRESSURE: 87 MMHG

## 2025-05-21 DIAGNOSIS — I10 PRIMARY HYPERTENSION: ICD-10-CM

## 2025-05-21 DIAGNOSIS — S42.102A CLOSED FRACTURE OF LEFT SCAPULA, UNSPECIFIED PART OF SCAPULA, INITIAL ENCOUNTER: Primary | ICD-10-CM

## 2025-05-21 PROCEDURE — 72125 CT NECK SPINE W/O DYE: CPT | Performed by: RADIOLOGY

## 2025-05-21 PROCEDURE — 99284 EMERGENCY DEPT VISIT MOD MDM: CPT | Mod: 25

## 2025-05-21 PROCEDURE — 72125 CT NECK SPINE W/O DYE: CPT

## 2025-05-21 PROCEDURE — 73030 X-RAY EXAM OF SHOULDER: CPT | Mod: LT

## 2025-05-21 PROCEDURE — 72128 CT CHEST SPINE W/O DYE: CPT

## 2025-05-21 PROCEDURE — 72128 CT CHEST SPINE W/O DYE: CPT | Performed by: RADIOLOGY

## 2025-05-21 PROCEDURE — 2500000004 HC RX 250 GENERAL PHARMACY W/ HCPCS (ALT 636 FOR OP/ED): Mod: JZ | Performed by: HEALTH CARE PROVIDER

## 2025-05-21 PROCEDURE — 73030 X-RAY EXAM OF SHOULDER: CPT | Mod: LEFT SIDE | Performed by: RADIOLOGY

## 2025-05-21 PROCEDURE — 96372 THER/PROPH/DIAG INJ SC/IM: CPT | Performed by: HEALTH CARE PROVIDER

## 2025-05-21 PROCEDURE — 2500000005 HC RX 250 GENERAL PHARMACY W/O HCPCS: Performed by: HEALTH CARE PROVIDER

## 2025-05-21 RX ORDER — KETOROLAC TROMETHAMINE 15 MG/ML
15 INJECTION, SOLUTION INTRAMUSCULAR; INTRAVENOUS ONCE
Status: DISCONTINUED | OUTPATIENT
Start: 2025-05-21 | End: 2025-05-21

## 2025-05-21 RX ORDER — OXYCODONE HYDROCHLORIDE 5 MG/1
5 TABLET ORAL ONCE
Refills: 0 | Status: DISCONTINUED | OUTPATIENT
Start: 2025-05-21 | End: 2025-05-21 | Stop reason: HOSPADM

## 2025-05-21 RX ORDER — LIDOCAINE 560 MG/1
1 PATCH PERCUTANEOUS; TOPICAL; TRANSDERMAL ONCE
Status: DISCONTINUED | OUTPATIENT
Start: 2025-05-21 | End: 2025-05-21 | Stop reason: HOSPADM

## 2025-05-21 RX ORDER — LIDOCAINE 50 MG/G
1 PATCH TOPICAL DAILY
Qty: 15 PATCH | Refills: 0 | Status: SHIPPED | OUTPATIENT
Start: 2025-05-21

## 2025-05-21 RX ORDER — KETOROLAC TROMETHAMINE 15 MG/ML
15 INJECTION, SOLUTION INTRAMUSCULAR; INTRAVENOUS ONCE
Status: COMPLETED | OUTPATIENT
Start: 2025-05-21 | End: 2025-05-21

## 2025-05-21 RX ORDER — KETOROLAC TROMETHAMINE 10 MG/1
10 TABLET, FILM COATED ORAL EVERY 6 HOURS PRN
Qty: 20 TABLET | Refills: 0 | Status: SHIPPED | OUTPATIENT
Start: 2025-05-21 | End: 2025-05-26

## 2025-05-21 RX ADMIN — KETOROLAC TROMETHAMINE 15 MG: 15 INJECTION, SOLUTION INTRAMUSCULAR; INTRAVENOUS at 17:53

## 2025-05-21 RX ADMIN — LIDOCAINE 4% 1 PATCH: 40 PATCH TOPICAL at 17:53

## 2025-05-21 ASSESSMENT — COLUMBIA-SUICIDE SEVERITY RATING SCALE - C-SSRS
2. HAVE YOU ACTUALLY HAD ANY THOUGHTS OF KILLING YOURSELF?: NO
6. HAVE YOU EVER DONE ANYTHING, STARTED TO DO ANYTHING, OR PREPARED TO DO ANYTHING TO END YOUR LIFE?: NO
1. IN THE PAST MONTH, HAVE YOU WISHED YOU WERE DEAD OR WISHED YOU COULD GO TO SLEEP AND NOT WAKE UP?: NO

## 2025-05-21 ASSESSMENT — LIFESTYLE VARIABLES
HAVE YOU EVER FELT YOU SHOULD CUT DOWN ON YOUR DRINKING: NO
TOTAL SCORE: 0
EVER FELT BAD OR GUILTY ABOUT YOUR DRINKING: NO
HAVE PEOPLE ANNOYED YOU BY CRITICIZING YOUR DRINKING: NO
EVER HAD A DRINK FIRST THING IN THE MORNING TO STEADY YOUR NERVES TO GET RID OF A HANGOVER: NO

## 2025-05-21 ASSESSMENT — PAIN - FUNCTIONAL ASSESSMENT: PAIN_FUNCTIONAL_ASSESSMENT: 0-10

## 2025-05-21 NOTE — Clinical Note
Andressa Yeny was seen and treated in our emergency department on 5/21/2025.  She may return to work on 05/22/2025.       If you have any questions or concerns, please don't hesitate to call.      Jose A Rome PA-C

## 2025-05-21 NOTE — ED TRIAGE NOTES
Pt BIB POV from home w/ c/o falling in shower, slipping, hurting left shoulder and left arm. Denies LOC, thinners, hitting head. No deformity appreciated but marked limited ROM. C/o pain most to left scapula, left neck pain with pain radiating from there to her upper back. Ambulatory, speaking in full sentences. Oriented.

## 2025-05-21 NOTE — ED PROVIDER NOTES
HPI   Chief Complaint   Patient presents with    Fall    Back Pain    Shoulder Pain       CC: Fall, left scapular pain back pain  HPI:   59-year-old female presents ED she is complaining of pain in the left scapula and throughout the upper back.  Patient denies hitting her head she denies any loss of consciousness, she denies any midline lumbosacral pain, patient denies any visual acuity changes denies any upper/lower extremity weakness numbness pain or paresthesia.  She denies have any chest pain, shortness of breath denies any rib pain.  Her abdominal pain she does not take any long-term anticoagulant antiplatelet medications.  She reports most of her pain is localized in the left lower scapular region.    Additional Limitations to History:   External Records Reviewed: I reviewed recent and relevant outside records including   History Obtained From:     Past Medical History: Per HPI  Medications: Reviewed in EMR and with patient  Allergies:  Reviewed in EMR  Past Surgical History:   Social History:     ------------------------------------------------------------------------------------------------------  Physical Exam:  --Vital signs reviewed in nursing triage note, EMR flow sheets, and at patient's bedside  GEN:  A&Ox3, no acute distress, appears comfortable.  Conversational and appropriate.  No confusion or gross mental status changes.  EYES: EOMI, non-injected sclera.  ENT: Moist mucous membranes, no apparent injuries or lesions.   CARDIO: Normal rate and regular rhythm. No murmurs, rubs, or gallops.  2+ equal pulses of the distal extremities.   PULM: Clear to auscultation bilaterally. No rales, rhonchi, or wheezes. Good symmetric chest expansion.  GI: Soft, non-tender, non-distended. No rebound tenderness or guarding.  SKIN: Warm and dry, no rashes or lesions.  MSK: ROM intact the extremities without contractures.   EXT: No peripheral edema, contusions, or wounds.   NEURO: Cranial nerves II-XII grossly intact.  Sensation to light touch intact and equal bilaterally in upper and lower extremities.  Symmetric 5/5 strength in upper and lower extremities.  PSYCH: Appropriate mood and behavior, converses and responds appropriately during exam.  -------------------------------------------------------------------------------------------------------      Differential Diagnoses Considered:   Chronic Medical Conditions Significantly Affecting Care:   Diagnostic testing considered: [PERC, D-Dimer, PECARN, etc.]      - I independently interpreted: [CXR, CT, POCUS, etc. including your interpretation]  - Labs notable for     Escalation of Care: Appropriate for   Social Determinants of Health Significantly Affecting Care: [Homelessness, lacking transportation, uninsured, unable to afford medications]  Prescription Drug Consideration: [Antibiotics, antivirals, pain medications, etc.]  Discussion of Management with Other Providers:  I discussed the patient/results with: [admitting team, consultant, radiologist, social work, EPAT, case management, PT/OT, RT, PCP, etc.]      Jose A Rome PA-C              Patient History   Medical History[1]  Surgical History[2]  Family History[3]  Social History[4]    Physical Exam   ED Triage Vitals [05/21/25 1546]   Temperature Heart Rate Respirations BP   35.9 °C (96.6 °F) 75 20 (!) 184/107      Pulse Ox Temp Source Heart Rate Source Patient Position   98 % Temporal Monitor Sitting      BP Location FiO2 (%)     Left arm --       Physical Exam  Musculoskeletal:        Back:            ED Course & MDM   Diagnoses as of 05/21/25 9596   Closed fracture of left scapula, unspecified part of scapula, initial encounter                 No data recorded     Leo Coma Scale Score: 15 (05/21/25 1624 : Burt Bean)                           Medical Decision Making  59-year-old female status post fall with acute fracture to the inferior angle of the scapula with displacement of the fracture site.  The patient  has good range of motion in the left upper extremity, remaining imaging unremarkable patient is aware of the pulmonary nodule and will follow-up with her primary care provider for further evaluation.  She is neurologically intact hemodynamically stable, nontoxic, no focal deficits on examination.  The extremity is neurovascularly intact distally.  Patient was given Toradol, lidocaine patch, advised conservative management at this time, advised outpatient follow-up with orthopedics may need outpatient physical therapy.        Procedure  Procedures       Jose A Rome PA-C  05/21/25 2086       [1]   Past Medical History:  Diagnosis Date    Arthritis     Hyperlipidemia     Hypertension     Nephrolithiasis     Type 2 diabetes mellitus    [2]   Past Surgical History:  Procedure Laterality Date    DENTAL SURGERY      Surgery for TMJ    EYE SURGERY      VITRECTOMY Left 10/2023   [3]   Family History  Problem Relation Name Age of Onset    Macular degeneration Maternal Grandmother      Breast cancer Maternal Cousin      Breast cancer Mother's Sister     [4]   Social History  Tobacco Use    Smoking status: Every Day     Current packs/day: 0.20     Types: Cigarettes     Passive exposure: Past    Smokeless tobacco: Never    Tobacco comments:     Pt denies any illness in past 30 days     Denies any personal/family reaction or problems with anesthesia     Denies SOB with stairs or daily activity     Ambulates freely     Able to lay flat x 30 minutes   Vaping Use    Vaping status: Never Used   Substance Use Topics    Alcohol use: Yes     Alcohol/week: 1.0 standard drink of alcohol     Types: 1 Standard drinks or equivalent per week     Comment: monthly    Drug use: Not Currently     Types: Marijuana     Comment: occ edible        Jose A Rome PA-C  05/21/25 5544

## 2025-05-22 RX ORDER — LOSARTAN POTASSIUM 50 MG/1
50 TABLET ORAL DAILY
Qty: 30 TABLET | Refills: 1 | Status: SHIPPED | OUTPATIENT
Start: 2025-05-22

## 2025-05-27 DIAGNOSIS — I10 PRIMARY HYPERTENSION: ICD-10-CM

## 2025-05-27 RX ORDER — LOSARTAN POTASSIUM 50 MG/1
50 TABLET ORAL DAILY
Qty: 90 TABLET | Refills: 0 | Status: SHIPPED | OUTPATIENT
Start: 2025-05-27

## 2025-05-28 ENCOUNTER — HOSPITAL ENCOUNTER (OUTPATIENT)
Dept: RADIOLOGY | Facility: HOSPITAL | Age: 60
Discharge: HOME | End: 2025-05-28
Payer: COMMERCIAL

## 2025-05-28 ENCOUNTER — OFFICE VISIT (OUTPATIENT)
Dept: ORTHOPEDIC SURGERY | Facility: HOSPITAL | Age: 60
End: 2025-05-28
Payer: COMMERCIAL

## 2025-05-28 DIAGNOSIS — S42.101A: Primary | ICD-10-CM

## 2025-05-28 DIAGNOSIS — M25.512 LEFT SHOULDER PAIN, UNSPECIFIED CHRONICITY: ICD-10-CM

## 2025-05-28 PROCEDURE — 99214 OFFICE O/P EST MOD 30 MIN: CPT | Mod: 25 | Performed by: STUDENT IN AN ORGANIZED HEALTH CARE EDUCATION/TRAINING PROGRAM

## 2025-05-28 PROCEDURE — 73030 X-RAY EXAM OF SHOULDER: CPT | Mod: LT

## 2025-05-28 PROCEDURE — 23570 CLTX SCAPULAR FX W/O MNPJ: CPT | Performed by: STUDENT IN AN ORGANIZED HEALTH CARE EDUCATION/TRAINING PROGRAM

## 2025-05-28 PROCEDURE — 99214 OFFICE O/P EST MOD 30 MIN: CPT | Performed by: STUDENT IN AN ORGANIZED HEALTH CARE EDUCATION/TRAINING PROGRAM

## 2025-05-28 PROCEDURE — 73030 X-RAY EXAM OF SHOULDER: CPT | Mod: LEFT SIDE | Performed by: STUDENT IN AN ORGANIZED HEALTH CARE EDUCATION/TRAINING PROGRAM

## 2025-05-28 PROCEDURE — 4010F ACE/ARB THERAPY RXD/TAKEN: CPT | Performed by: STUDENT IN AN ORGANIZED HEALTH CARE EDUCATION/TRAINING PROGRAM

## 2025-05-28 NOTE — PROGRESS NOTES
Andressa Saini is a 59 y.o. year-old  female  she is a new patient to our office and presents with a chief complaint of Left shoulder pain.  She notes about a week ago she fell out of the bath to landed on her left side.  She was seen in the emergency department and found to have a scapula fracture given a sling and follows up.  She is still pretty sore but is feeling better.      Past Medical, Family, and Social History reviewed     Review of Systems  A complete review of systems was conducted, pertinent only to the HPI noted above.    Physical Exam  GEN: Alert and Oriented x 3  Constitutional: Well appearing, in no apparent distress.  Eyes: sclera anicteric  ENT: hearing appropriate for normal conversation, neck appears symmetric with no gross thyromegaly  Pulm: No labored breathing, no wheezing  CVS: Regular rate and rhythm  PSY: normal mood and affect  Skin: No rashes, erythema, or induration around shoulder     Focused Musculoskeletal Exam:     Side: Left shoulder:  PROM:   FE (150)   ER 60   AROM:   FE (120)  due to pain  ER 45 IR back pocket  Strength:  Supra [4/5] Infra [5/5] Subscap [5/5]  Abd [5/5]    Special Tests  Shoulder  Tenderness over the inferior border of the scapula no crepitus noticed    [Sensation intact Ax/median/ulnar/radial distributions  Motor intact Ax/median/radial/ulnar/AIN/PIN    X-rays of the shoulder independently viewed and interpreted: Minimally displaced inferior border of scapula fracture    The patient history, physical examination and imaging studies are consistent with the diagnosis above.    I reviewed with the patient diagnosis of a minimally displaced scapular fracture.  This is amenable to nonoperative treatment.  We discussed sling for comfort progressive range of motion.  She is going to return to work we will put a few restrictions on her till this is healed up.  Will see her back in 4 weeks with repeat radiographs.  All questions were answered and agreed with this  plan.

## 2025-05-28 NOTE — LETTER
May 28, 2025     Patient: Andressa Saini   YOB: 1965   Date of Visit: 5/28/2025       To Whom It May Concern:    It is my medical opinion that Andressa Saini is allowed to return to work on 5/29/25 but must be wearing her sling and no lifting anything with the left arm due to a shoulder fracture for the next 4 weeks until the next evaluation.    If you have any questions or concerns, please don't hesitate to call.         Sincerely,        Tuan Corrales MD    CC: No Recipients

## 2025-06-12 DIAGNOSIS — E11.9 TYPE 2 DIABETES MELLITUS WITHOUT COMPLICATION, WITH LONG-TERM CURRENT USE OF INSULIN: ICD-10-CM

## 2025-06-12 DIAGNOSIS — Z79.4 TYPE 2 DIABETES MELLITUS WITHOUT COMPLICATION, WITH LONG-TERM CURRENT USE OF INSULIN: ICD-10-CM

## 2025-06-13 RX ORDER — INSULIN GLARGINE 100 [IU]/ML
INJECTION, SOLUTION SUBCUTANEOUS
Qty: 12 EACH | Refills: 1 | Status: SHIPPED | OUTPATIENT
Start: 2025-06-13

## 2025-06-24 ENCOUNTER — OFFICE VISIT (OUTPATIENT)
Dept: ORTHOPEDIC SURGERY | Facility: HOSPITAL | Age: 60
End: 2025-06-24
Payer: COMMERCIAL

## 2025-06-24 ENCOUNTER — HOSPITAL ENCOUNTER (OUTPATIENT)
Dept: RADIOLOGY | Facility: HOSPITAL | Age: 60
Discharge: HOME | End: 2025-06-24
Payer: COMMERCIAL

## 2025-06-24 DIAGNOSIS — S42.101A: ICD-10-CM

## 2025-06-24 DIAGNOSIS — S42.102A: ICD-10-CM

## 2025-06-24 PROCEDURE — 73030 X-RAY EXAM OF SHOULDER: CPT | Mod: LT

## 2025-06-24 PROCEDURE — 4010F ACE/ARB THERAPY RXD/TAKEN: CPT | Performed by: STUDENT IN AN ORGANIZED HEALTH CARE EDUCATION/TRAINING PROGRAM

## 2025-06-24 PROCEDURE — 73010 X-RAY EXAM OF SHOULDER BLADE: CPT | Mod: LT

## 2025-06-24 PROCEDURE — 99212 OFFICE O/P EST SF 10 MIN: CPT | Performed by: STUDENT IN AN ORGANIZED HEALTH CARE EDUCATION/TRAINING PROGRAM

## 2025-06-24 PROCEDURE — 73030 X-RAY EXAM OF SHOULDER: CPT | Mod: LEFT SIDE | Performed by: RADIOLOGY

## 2025-06-24 PROCEDURE — 73010 X-RAY EXAM OF SHOULDER BLADE: CPT | Mod: LEFT SIDE | Performed by: RADIOLOGY

## 2025-06-24 PROCEDURE — 99024 POSTOP FOLLOW-UP VISIT: CPT | Performed by: STUDENT IN AN ORGANIZED HEALTH CARE EDUCATION/TRAINING PROGRAM

## 2025-06-24 ASSESSMENT — PAIN SCALES - GENERAL: PAINLEVEL_OUTOF10: 1

## 2025-06-24 ASSESSMENT — PAIN - FUNCTIONAL ASSESSMENT: PAIN_FUNCTIONAL_ASSESSMENT: 0-10

## 2025-06-24 NOTE — PROGRESS NOTES
Andressa Saini is a 59 y.o. year-old  female she follows up with a scapular fracture.  She is now about 5 weeks out she said last week it really seemed to turn the corner with minimal pain.  She is mostly out of the sling.  Only wears it at work.    Past Medical, Family, and Social History reviewed     Review of Systems  A complete review of systems was conducted, pertinent only to the HPI noted above.    Physical Exam  GEN: Alert and Oriented x 3  Constitutional: Well appearing, in no apparent distress.  Eyes: sclera anicteric  ENT: hearing appropriate for normal conversation, neck appears symmetric with no gross thyromegaly  Pulm: No labored breathing, no wheezing  CVS: Regular rate and rhythm  PSY: normal mood and affect  Skin: No rashes, erythema, or induration around shoulder     Focused Musculoskeletal Exam:     Side: Left shoulder:  PROM:   FE (150)   ER 60   AROM:   FE (120)  due to pain  ER 45 IR back pocket  Strength:  Supra [5/5] Infra [5/5] Subscap [5/5]  Abd [5/5]    Special Tests  Shoulder  Minimal tenderness over the inferior border of the scapula no crepitus noticed    [Sensation intact Ax/median/ulnar/radial distributions  Motor intact Ax/median/radial/ulnar/AIN/PIN    X-rays of the shoulder independently viewed and interpreted: Minimally displaced inferior border of scapula fracture no change in alignment and some early callus formation.    The patient history, physical examination and imaging studies are consistent with the diagnosis above.    I did review that the fracture seems to be healing as expected and clinically she is doing much better.  Reviewed she should hopefully become asymptomatic in the next 4 to 6 weeks.  As long as she is asymptomatic no need for further imaging.  If she still having pain in 6 weeks would recommend a repeat x-ray.  She can resume all activities as tolerated.  All questions were answered and agreed with this plan.

## 2025-06-28 DIAGNOSIS — Z79.4 TYPE 2 DIABETES MELLITUS WITHOUT COMPLICATION, WITH LONG-TERM CURRENT USE OF INSULIN: ICD-10-CM

## 2025-06-28 DIAGNOSIS — E11.9 TYPE 2 DIABETES MELLITUS WITHOUT COMPLICATION, WITH LONG-TERM CURRENT USE OF INSULIN: ICD-10-CM

## 2025-07-03 RX ORDER — PEN NEEDLE, DIABETIC 32GX 5/32"
NEEDLE, DISPOSABLE MISCELLANEOUS
Qty: 100 EACH | Refills: 1 | Status: SHIPPED | OUTPATIENT
Start: 2025-07-03

## 2025-07-12 DIAGNOSIS — E11.9 TYPE 2 DIABETES MELLITUS WITHOUT COMPLICATION, WITH LONG-TERM CURRENT USE OF INSULIN: ICD-10-CM

## 2025-07-12 DIAGNOSIS — Z79.4 TYPE 2 DIABETES MELLITUS WITHOUT COMPLICATION, WITH LONG-TERM CURRENT USE OF INSULIN: ICD-10-CM

## 2025-07-19 RX ORDER — INSULIN GLARGINE 100 [IU]/ML
INJECTION, SOLUTION SUBCUTANEOUS
Qty: 15 ML | Refills: 0 | Status: SHIPPED | OUTPATIENT
Start: 2025-07-19

## 2025-07-22 ENCOUNTER — APPOINTMENT (OUTPATIENT)
Dept: GASTROENTEROLOGY | Facility: HOSPITAL | Age: 60
End: 2025-07-22
Payer: COMMERCIAL

## 2025-08-02 DIAGNOSIS — Z00.00 HEALTH CARE MAINTENANCE: ICD-10-CM

## 2025-08-09 RX ORDER — FLUCONAZOLE 150 MG/1
150 TABLET ORAL DAILY
Qty: 2 TABLET | Refills: 0 | Status: SHIPPED | OUTPATIENT
Start: 2025-08-09

## 2025-08-24 DIAGNOSIS — Z79.4 TYPE 2 DIABETES MELLITUS WITHOUT COMPLICATION, WITH LONG-TERM CURRENT USE OF INSULIN: ICD-10-CM

## 2025-08-24 DIAGNOSIS — E11.9 TYPE 2 DIABETES MELLITUS WITHOUT COMPLICATION, WITH LONG-TERM CURRENT USE OF INSULIN: ICD-10-CM

## 2025-08-30 RX ORDER — INSULIN GLARGINE 100 [IU]/ML
INJECTION, SOLUTION SUBCUTANEOUS
Qty: 15 ML | Refills: 0 | Status: SHIPPED | OUTPATIENT
Start: 2025-08-30

## 2025-09-02 ENCOUNTER — APPOINTMENT (OUTPATIENT)
Dept: PRIMARY CARE | Facility: CLINIC | Age: 60
End: 2025-09-02
Payer: COMMERCIAL

## 2025-09-06 LAB
ALBUMIN SERPL-MCNC: 4.1 G/DL (ref 3.6–5.1)
ALP SERPL-CCNC: 81 U/L (ref 37–153)
ALT SERPL-CCNC: 22 U/L (ref 6–29)
ANION GAP SERPL CALCULATED.4IONS-SCNC: 11 MMOL/L (CALC) (ref 7–17)
AST SERPL-CCNC: 22 U/L (ref 10–35)
BILIRUB SERPL-MCNC: 0.6 MG/DL (ref 0.2–1.2)
BUN SERPL-MCNC: 26 MG/DL (ref 7–25)
CALCIUM SERPL-MCNC: 9.3 MG/DL (ref 8.6–10.4)
CHLORIDE SERPL-SCNC: 101 MMOL/L (ref 98–110)
CHOLEST SERPL-MCNC: 184 MG/DL
CHOLEST/HDLC SERPL: 3.9 (CALC)
CO2 SERPL-SCNC: 24 MMOL/L (ref 20–32)
CREAT SERPL-MCNC: 1.59 MG/DL (ref 0.5–1.03)
EGFRCR SERPLBLD CKD-EPI 2021: 37 ML/MIN/1.73M2
ERYTHROCYTE [DISTWIDTH] IN BLOOD BY AUTOMATED COUNT: 13.4 % (ref 11–15)
EST. AVERAGE GLUCOSE BLD GHB EST-MCNC: 289 MG/DL
EST. AVERAGE GLUCOSE BLD GHB EST-SCNC: 16 MMOL/L
GLUCOSE SERPL-MCNC: 277 MG/DL (ref 65–139)
HBA1C MFR BLD: 11.7 %
HCT VFR BLD AUTO: 39.8 % (ref 35–45)
HDLC SERPL-MCNC: 47 MG/DL
HGB BLD-MCNC: 12.5 G/DL (ref 11.7–15.5)
LDLC SERPL CALC-MCNC: 110 MG/DL (CALC)
MCH RBC QN AUTO: 26.7 PG (ref 27–33)
MCHC RBC AUTO-ENTMCNC: 31.4 G/DL (ref 32–36)
MCV RBC AUTO: 84.9 FL (ref 80–100)
NONHDLC SERPL-MCNC: 137 MG/DL (CALC)
PLATELET # BLD AUTO: 287 THOUSAND/UL (ref 140–400)
PMV BLD REES-ECKER: 11.6 FL (ref 7.5–12.5)
POTASSIUM SERPL-SCNC: 4.7 MMOL/L (ref 3.5–5.3)
PROT SERPL-MCNC: 6.6 G/DL (ref 6.1–8.1)
RBC # BLD AUTO: 4.69 MILLION/UL (ref 3.8–5.1)
SODIUM SERPL-SCNC: 136 MMOL/L (ref 135–146)
TRIGL SERPL-MCNC: 156 MG/DL
TSH SERPL-ACNC: 1.59 MIU/L (ref 0.4–4.5)
WBC # BLD AUTO: 5.8 THOUSAND/UL (ref 3.8–10.8)

## (undated) DEVICE — NEEDLE, HYPODERMIC, REGULAR WALL, REGULAR BEVEL, 30 G X 0.5 IN

## (undated) DEVICE — DRESSING, TRANSPARENT, TEGADERM, 4 X 4-3/4 IN

## (undated) DEVICE — GLOVE, SURGICAL, PROTEXIS PI , 7.5, PF, LF

## (undated) DEVICE — NEEDLE, RETROBULBAR, 23G X 8MM

## (undated) DEVICE — SYRINGE, 1 CC, LUER LOCK

## (undated) DEVICE — SYRINGE, 5 CC, LUER LOCK

## (undated) DEVICE — SUTURE, VICRYL, CTD, 7-0, TG1408, VIOLET

## (undated) DEVICE — NEEDLE, HYPODERMIC, REGULAR WALL, REGULAR BEVEL, 18 G X 1.5 IN

## (undated) DEVICE — Device

## (undated) DEVICE — LABELS, OR GENERAL, W/MARKER

## (undated) DEVICE — MEMBRANE SCRAPER, CURVED, 25G PLUS, FLEX LOOP

## (undated) DEVICE — SYRINGE, 10 CC, LUER LOCK

## (undated) DEVICE — NEEDLE, ARTERIAL/VENOUS, BLUNT FILL, 18 G X 1.5 IN

## (undated) DEVICE — KIT, CONSTELLATION: 25G VITRECTOMY

## (undated) DEVICE — PROBE, DIATHERMY 25G

## (undated) DEVICE — TOWEL, SURGICAL, NEURO, O/R, 16 X 26, BLUE, STERILE